# Patient Record
Sex: MALE | Race: WHITE | NOT HISPANIC OR LATINO | URBAN - NONMETROPOLITAN AREA
[De-identification: names, ages, dates, MRNs, and addresses within clinical notes are randomized per-mention and may not be internally consistent; named-entity substitution may affect disease eponyms.]

---

## 2023-02-10 PROBLEM — E11.9 DIABETES MELLITUS, TYPE 2 (MULTI): Status: ACTIVE | Noted: 2023-02-10

## 2023-02-10 PROBLEM — M25.561 ARTHRALGIA OF BOTH KNEES: Status: ACTIVE | Noted: 2023-02-10

## 2023-02-10 PROBLEM — R93.1 AGATSTON CAC SCORE 200-399: Status: ACTIVE | Noted: 2023-02-10

## 2023-02-10 PROBLEM — M25.562 ARTHRALGIA OF BOTH KNEES: Status: ACTIVE | Noted: 2023-02-10

## 2023-02-10 PROBLEM — R73.03 PREDIABETES: Status: ACTIVE | Noted: 2023-02-10

## 2023-02-10 PROBLEM — R79.89 ELEVATED SERUM CREATININE: Status: ACTIVE | Noted: 2023-02-10

## 2023-02-10 PROBLEM — I10 HTN (HYPERTENSION): Status: ACTIVE | Noted: 2023-02-10

## 2023-02-10 PROBLEM — R94.31 ABNORMAL EKG: Status: ACTIVE | Noted: 2023-02-10

## 2023-02-10 PROBLEM — R53.82 CHRONIC FATIGUE: Status: ACTIVE | Noted: 2023-02-10

## 2023-02-10 PROBLEM — M25.542 ARTHRALGIA OF BOTH HANDS: Status: ACTIVE | Noted: 2023-02-10

## 2023-02-10 PROBLEM — M25.541 ARTHRALGIA OF BOTH HANDS: Status: ACTIVE | Noted: 2023-02-10

## 2023-02-10 PROBLEM — E78.00 ELEVATED LDL CHOLESTEROL LEVEL: Status: ACTIVE | Noted: 2023-02-10

## 2023-02-10 PROBLEM — M25.50 JOINT PAIN: Status: ACTIVE | Noted: 2023-02-10

## 2023-02-10 RX ORDER — MULTIVIT WITH CALCIUM,IRON,MIN 18MG-0.4MG
1 TABLET ORAL DAILY
COMMUNITY

## 2023-02-10 RX ORDER — MAG HYDROX/ALUMINUM HYD/SIMETH 400-400-40
1 SUSPENSION, ORAL (FINAL DOSE FORM) ORAL DAILY
COMMUNITY

## 2023-02-10 RX ORDER — LOSARTAN POTASSIUM 100 MG/1
100 TABLET ORAL DAILY
COMMUNITY
Start: 2020-10-27 | End: 2023-10-16 | Stop reason: SDUPTHER

## 2023-02-10 RX ORDER — BACITRACIN 500 UNIT/G
1 OINTMENT (GRAM) TOPICAL
COMMUNITY

## 2023-02-10 RX ORDER — PRASTERONE (DHEA) 50 MG
1 CAPSULE ORAL DAILY
COMMUNITY

## 2023-02-10 RX ORDER — VANADIUM 100 %
1 GRANULES (GRAM) MISCELLANEOUS DAILY
COMMUNITY

## 2023-02-10 RX ORDER — CLONIDINE HYDROCHLORIDE 0.2 MG/1
0.2 TABLET ORAL 3 TIMES DAILY
COMMUNITY
Start: 2020-09-08 | End: 2023-10-16 | Stop reason: SDUPTHER

## 2023-02-10 RX ORDER — ASPIRIN 81 MG/1
81 TABLET ORAL DAILY
COMMUNITY
Start: 2020-09-18

## 2023-02-10 RX ORDER — CARVEDILOL 25 MG/1
25 TABLET ORAL 2 TIMES DAILY
COMMUNITY
Start: 2020-10-13 | End: 2023-10-16 | Stop reason: SDUPTHER

## 2023-04-14 ENCOUNTER — OFFICE VISIT (OUTPATIENT)
Dept: PRIMARY CARE | Facility: CLINIC | Age: 63
End: 2023-04-14
Payer: MEDICAID

## 2023-04-14 ENCOUNTER — LAB (OUTPATIENT)
Dept: LAB | Facility: LAB | Age: 63
End: 2023-04-14
Payer: MEDICAID

## 2023-04-14 VITALS
DIASTOLIC BLOOD PRESSURE: 72 MMHG | BODY MASS INDEX: 36.07 KG/M2 | WEIGHT: 266.3 LBS | HEART RATE: 67 BPM | HEIGHT: 72 IN | SYSTOLIC BLOOD PRESSURE: 130 MMHG | OXYGEN SATURATION: 98 %

## 2023-04-14 DIAGNOSIS — M25.561 ARTHRALGIA OF BOTH KNEES: ICD-10-CM

## 2023-04-14 DIAGNOSIS — R79.89 ELEVATED SERUM CREATININE: ICD-10-CM

## 2023-04-14 DIAGNOSIS — I10 PRIMARY HYPERTENSION: ICD-10-CM

## 2023-04-14 DIAGNOSIS — E78.00 ELEVATED LDL CHOLESTEROL LEVEL: ICD-10-CM

## 2023-04-14 DIAGNOSIS — Z12.5 PROSTATE CANCER SCREENING: ICD-10-CM

## 2023-04-14 DIAGNOSIS — S99.921A INJURY OF RIGHT FOOT, INITIAL ENCOUNTER: ICD-10-CM

## 2023-04-14 DIAGNOSIS — M25.562 ARTHRALGIA OF BOTH KNEES: ICD-10-CM

## 2023-04-14 DIAGNOSIS — E11.69 TYPE 2 DIABETES MELLITUS WITH OTHER SPECIFIED COMPLICATION, WITHOUT LONG-TERM CURRENT USE OF INSULIN (MULTI): Primary | ICD-10-CM

## 2023-04-14 DIAGNOSIS — E11.69 TYPE 2 DIABETES MELLITUS WITH OTHER SPECIFIED COMPLICATION, WITHOUT LONG-TERM CURRENT USE OF INSULIN (MULTI): ICD-10-CM

## 2023-04-14 LAB
ESTIMATED AVERAGE GLUCOSE FOR HBA1C: 160 MG/DL
HEMOGLOBIN A1C/HEMOGLOBIN TOTAL IN BLOOD: 7.2 %

## 2023-04-14 PROCEDURE — 1036F TOBACCO NON-USER: CPT | Performed by: STUDENT IN AN ORGANIZED HEALTH CARE EDUCATION/TRAINING PROGRAM

## 2023-04-14 PROCEDURE — 4010F ACE/ARB THERAPY RXD/TAKEN: CPT | Performed by: STUDENT IN AN ORGANIZED HEALTH CARE EDUCATION/TRAINING PROGRAM

## 2023-04-14 PROCEDURE — 3075F SYST BP GE 130 - 139MM HG: CPT | Performed by: STUDENT IN AN ORGANIZED HEALTH CARE EDUCATION/TRAINING PROGRAM

## 2023-04-14 PROCEDURE — 83036 HEMOGLOBIN GLYCOSYLATED A1C: CPT

## 2023-04-14 PROCEDURE — 3051F HG A1C>EQUAL 7.0%<8.0%: CPT | Performed by: STUDENT IN AN ORGANIZED HEALTH CARE EDUCATION/TRAINING PROGRAM

## 2023-04-14 PROCEDURE — 3078F DIAST BP <80 MM HG: CPT | Performed by: STUDENT IN AN ORGANIZED HEALTH CARE EDUCATION/TRAINING PROGRAM

## 2023-04-14 PROCEDURE — 36415 COLL VENOUS BLD VENIPUNCTURE: CPT

## 2023-04-14 PROCEDURE — 99214 OFFICE O/P EST MOD 30 MIN: CPT | Performed by: STUDENT IN AN ORGANIZED HEALTH CARE EDUCATION/TRAINING PROGRAM

## 2023-04-14 ASSESSMENT — PATIENT HEALTH QUESTIONNAIRE - PHQ9
SUM OF ALL RESPONSES TO PHQ9 QUESTIONS 1 AND 2: 0
1. LITTLE INTEREST OR PLEASURE IN DOING THINGS: NOT AT ALL
2. FEELING DOWN, DEPRESSED OR HOPELESS: NOT AT ALL

## 2023-04-14 NOTE — PROGRESS NOTES
Subjective   Patient ID: Jacobo Dixon is a 62 y.o. male who presents for 6 month (Pt. 6 month mdck.).    HPI    HTN   Was on dyazide and developed ckd from dehydration   Cozaar 50 mg 2 daily  Did not tolerate amlodipine  Tolerating current regimen at this time.   In the past, clonidine was reduced eventually, however, BP increased, due to which the clonidine was put back to his normal dose. currently taking 0.2mg. denies lightheadedness. Blood pressure is normal as well.   Plan: We will continue to current dosage for now as BP is within normal limits.   constipation and dry mouth from clonidine. which is manageable for now. taking fiber and increase water content.    Denies chest pain, SOB, headaches or blurry vision.     DM2: A1C is 7.1 most recent . Need A1C now.  recommended dietary modifications.     Right foot injury few days ago. Heavy object - trailer fell on the foot. Pain+. Is able to ambulate without support.   Plan: recommended to use hard sole shoe. Try to avoid pressure on the area. Xray ordered.       Review of Systems  ROS negative except discussed above in HPI.    Vitals:    04/14/23 0805   BP: 130/72   Pulse: 67   SpO2: 98%     Objective   Physical Exam  Constitutional:       Appearance: Normal appearance.   Cardiovascular:      Rate and Rhythm: Normal rate and regular rhythm.   Pulmonary:      Effort: Pulmonary effort is normal.      Breath sounds: Normal breath sounds.   Musculoskeletal:      Cervical back: Normal range of motion and neck supple.      Comments: Right foot with erythema and tenderness on palpation. Bruising noticed ini the right 2-4 toes.    Lymphadenopathy:      Cervical: No cervical adenopathy.   Neurological:      Mental Status: He is alert.           Assessment/Plan   Jacobo was seen today for 6 month.  Diagnoses and all orders for this visit:  Type 2 diabetes mellitus with other specified complication, without long-term current use of insulin (CMS/[facility]) (Primary)  -      Hemoglobin A1C; Standing  -     CBC and Auto Differential; Future  Primary hypertension  -     CBC and Auto Differential; Future  Elevated serum creatinine  -     Comprehensive Metabolic Panel; Future  Elevated LDL cholesterol level  -     Lipid Panel; Future  Prostate cancer screening  -     Prostate Specific Antigen, Screen; Future  -     CBC and Auto Differential; Future  Arthralgia of both knees  -     CBC and Auto Differential; Future      Follow up in 6 months.      Addendum with results: referral to podiatry is provided given the displaced first metatarsal fracture.     Kalpesh Carmona MD MPH

## 2023-04-17 DIAGNOSIS — S92.311A DISPLACED FRACTURE OF FIRST METATARSAL BONE, RIGHT FOOT, INITIAL ENCOUNTER FOR CLOSED FRACTURE: Primary | ICD-10-CM

## 2023-04-27 ENCOUNTER — HOSPITAL ENCOUNTER (OUTPATIENT)
Dept: DATA CONVERSION | Facility: HOSPITAL | Age: 63
End: 2023-04-27
Attending: PODIATRIST | Admitting: PODIATRIST
Payer: MEDICAID

## 2023-04-27 DIAGNOSIS — G47.33 OBSTRUCTIVE SLEEP APNEA (ADULT) (PEDIATRIC): ICD-10-CM

## 2023-04-27 DIAGNOSIS — S93.311A SUBLUXATION OF TARSAL JOINT OF RIGHT FOOT, INITIAL ENCOUNTER: ICD-10-CM

## 2023-04-27 DIAGNOSIS — S92.311A DISPLACED FRACTURE OF FIRST METATARSAL BONE, RIGHT FOOT, INITIAL ENCOUNTER FOR CLOSED FRACTURE: ICD-10-CM

## 2023-04-27 DIAGNOSIS — I10 ESSENTIAL (PRIMARY) HYPERTENSION: ICD-10-CM

## 2023-04-27 DIAGNOSIS — E11.9 TYPE 2 DIABETES MELLITUS WITHOUT COMPLICATIONS (MULTI): ICD-10-CM

## 2023-04-27 DIAGNOSIS — Z90.89 ACQUIRED ABSENCE OF OTHER ORGANS: ICD-10-CM

## 2023-04-27 DIAGNOSIS — Z79.82 LONG TERM (CURRENT) USE OF ASPIRIN: ICD-10-CM

## 2023-04-27 DIAGNOSIS — E66.01 MORBID (SEVERE) OBESITY DUE TO EXCESS CALORIES (MULTI): ICD-10-CM

## 2023-06-05 LAB — CALCIDIOL (25 OH VITAMIN D3) (NG/ML) IN SER/PLAS: 49 NG/ML

## 2023-09-07 VITALS — BODY MASS INDEX: 42.66 KG/M2 | WEIGHT: 315 LBS | HEIGHT: 72 IN

## 2023-09-14 NOTE — H&P
History & Physical Reviewed:   I have reviewed the History and Physical dated:  14-Apr-2023   History and Physical reviewed and relevant findings noted. Patient examined to review pertinent physical  findings.: No significant changes   Home Medications Reviewed: no changes noted   Allergies Reviewed: no changes noted       ERAS (Enhanced Recovery After Surgery):  ·  ERAS Patient: no     Consent:   COVID-19 Consent:  ·  COVID-19 Risk Consent Surgeon has reviewed key risks related to the risk of maury COVID-19 and if they contract COVID-19 what the risks are.       Electronic Signatures:  Sarita Waters)  (Signed 27-Apr-2023 07:06)   Authored: History & Physical Reviewed, ERAS, Consent,  Note Completion      Last Updated: 27-Apr-2023 07:06 by Sarita Waters)

## 2023-10-02 NOTE — OP NOTE
PROCEDURE DETAILS    Preoperative Diagnosis:   First metatarsal fracture right foot (S92.31A)  Postoperative Diagnosis:   First metatarsal fracture right foot (S92.31A)  Surgeon: Sarita Waters  Resident/Fellow/Other Assistant: None of these were associated with this case    Procedure:  1. ORIF RIGHT 1ST METATARSAL     Anesthesia: Bob Nichols  Estimated Blood Loss: 75 mL  Findings: fracture reduction maintained with solid fixation, hemostasis controlled, see detailed operation report  Specimens(s) Collected: no,     Complications: none  Drains and/or Catheters: none  Implants: cristy t plate, 3 x 2.7 locking screws, 3 x 2.7 cortical screws, 2-0 vicryl, 2-0 and 4-0 prolene/ nylon  Tourniquet Times: 350 mmHg thigh tourniquet, 6 min  Patient Returned To/Condition: stable        Operative Report:     Indications:    This pleasant 62-year-old male sustained a right foot injury about 2 weeks ago where a trailor piece crushed his right foot. He denies loss of consciousness or other injuries. This weighed over 1000 pounds. He does have a significant past medical history  of hypertension, and diabetes (A1c 7.1%).  He denies routine claudication or history of lower extremity wounds or delayed healing.  He also denies history of other fractures.  He presented initially to his PCP office (Dr. Matthews) and xray demonstrated  distal right first metatarsal fracture fracture in short oblique orientation. This is displaced and considered unstable. No other fractures were noted. His neurovascular status is intact clinically. He does not demonstrate pain or injury to lisfranc or  other midfoot structures.     The preoperative indications, benefits, risks, complications, and anticipated healing time and management were reviewed in detail with the patient.  Preoperative optimization, clearance, and all diagnostic data were reviewed in detail.  The patient elects  to proceed forward at this time.  The patient  understands risks and complications include but are not limited to the following: failed hardware, infection, delayed or nonhealing, need for further surgery, blood clot, allergic reaction, chronic pain syndrome,  use of long-term assisted device, swelling, scar, over or under correction, nerve injury with resultant pain or numbness, recurrence, postoperative arthritis, need for long term bracing, loss of function, loss of limb, or loss of life.  The surgical consent  and limb were signed.  I answered all the patient's questions to their satisfaction.    Procedure in detail:    The patient was transported to the operating room via cart and placed on the operating room table in the supine position in a well padded manner.  Preoperative IV ancef was administered.   Final verification of the patient, surgery, and limb designation was performed via the timeout procedure.  The anesthesia team initiated planned anesthesia (General). I administered right foot first ray block of 1:1 mix of 1% lidocaine plain and 0.5% marcaine plain (16cc) .   A well-padded pneumatic thigh tourniquet was placed on the surgical limb as noted.  The surgical limb was prepped and draped in the usual aseptic manner.  Surgery began in the following manner:    Esmarch was used to exsanguinate the limb  and the tourniquet was inflated at this time.  Attention was first directed to the medial dorsal right foot in which an incision was made through the skin.  Electrocautorization used to maintain hemostasis.  Venous tourniquet was supsected and the tourniquet  was d/c at this time. Bleeding significantly reduced at this time. Blunt dissection was performed down to the fracture hematoma site with care being taken to avoid and protect and retract all neurovascular structures at this point and throughout the remainder  of surgery.  Capsular incision was made to expose this site and metatarsal periosteum was preserved. Hemostasis was controlled with  electrocauterization and pressure.   The fracture hematoma  was mobilized and partial callous formation was excised from  the fracture margin to allow reduction.   The fracture was reduced with a bone reducing forceps and through plate reduction. The distal end of the T-plate at the first metatarsal head was secured.  Distraction was used to further reduce the fracture followed  by proximal cortical screw placement.  Proper AO fixation technique was utilized.  Additional cortical and locking screws were applied proximal and distal.    Solid fixation was achieved.  Direct visualization and intra operative flouroscopy evaluation  confirmed deformity and fracture fragment reduction and placement of hardware is in desired trajectory and position.   No additional acute findings were noted with radiographic evaluation at this time.  At this time copious saline irrigation was performed.   Deep closure was initiated.  Brisk capillary refill time was noted to all digits of the surgical limb and no pulsatile bleeding was noted.  Additional skin closure was performed with nylon and prolene with no touch none tensile manner including simple  and horizontal mattress techniques. Dressing was applied with Betadine soaked Adaptic, 4 x 4 gauze, Kerlix, abdominal pad.  His right leg burn to the anterior mid leg looks stable without infection also.  An adaptic and dry gauze was applied next.  Next,  a well-padded posterior mold fiberglass splint was applied with the surgical limb in a rectus position.  This was secured with ace wrap.    After procedure:    The patient tolerated the procedure and anesthesia well.    The patient was transported to the recovery area with vital signs stable and vascular status intact to the surgical limb.    Electronic orders placed.    To ice and elevate for pain and inflammation management.  Pain medication plan in place. He was advised on safe and proper use.  Clarified weightbearing status; non  weightbear surgical limb with splint and assistive device.   To take time off work.   Post operative xrays were reviewed as noted.  The patient will be discharged home upon continued stability.  To follow up next week at [facility].    ALFONZO Rowell  [facility]                            Attestation:   Note Completion:  Attending Attestation I performed the procedure without a resident         Electronic Signatures:  Sarita Waters)  (Signed 27-Apr-2023 11:42)   Authored: Post-Operative Note, Chart Review, Note Completion      Last Updated: 27-Apr-2023 11:42 by Sarita Waters)

## 2023-10-11 ENCOUNTER — LAB (OUTPATIENT)
Dept: LAB | Facility: LAB | Age: 63
End: 2023-10-11
Payer: MEDICAID

## 2023-10-11 DIAGNOSIS — E11.69 TYPE 2 DIABETES MELLITUS WITH OTHER SPECIFIED COMPLICATION, WITHOUT LONG-TERM CURRENT USE OF INSULIN (MULTI): ICD-10-CM

## 2023-10-11 LAB
EST. AVERAGE GLUCOSE BLD GHB EST-MCNC: 174 MG/DL
HBA1C MFR BLD: 7.7 %

## 2023-10-11 PROCEDURE — 36415 COLL VENOUS BLD VENIPUNCTURE: CPT

## 2023-10-11 PROCEDURE — 83036 HEMOGLOBIN GLYCOSYLATED A1C: CPT

## 2023-10-16 ENCOUNTER — TELEPHONE (OUTPATIENT)
Dept: PRIMARY CARE | Facility: CLINIC | Age: 63
End: 2023-10-16

## 2023-10-16 ENCOUNTER — ANCILLARY PROCEDURE (OUTPATIENT)
Dept: RADIOLOGY | Facility: CLINIC | Age: 63
End: 2023-10-16
Payer: MEDICAID

## 2023-10-16 ENCOUNTER — OFFICE VISIT (OUTPATIENT)
Dept: PRIMARY CARE | Facility: CLINIC | Age: 63
End: 2023-10-16
Payer: MEDICAID

## 2023-10-16 VITALS
SYSTOLIC BLOOD PRESSURE: 160 MMHG | HEIGHT: 72 IN | BODY MASS INDEX: 37.03 KG/M2 | HEART RATE: 66 BPM | OXYGEN SATURATION: 98 % | DIASTOLIC BLOOD PRESSURE: 90 MMHG | WEIGHT: 273.4 LBS

## 2023-10-16 DIAGNOSIS — S92.311A DISPLACED FRACTURE OF FIRST METATARSAL BONE, RIGHT FOOT, INITIAL ENCOUNTER FOR CLOSED FRACTURE: ICD-10-CM

## 2023-10-16 DIAGNOSIS — Z12.5 PROSTATE CANCER SCREENING: ICD-10-CM

## 2023-10-16 DIAGNOSIS — E78.00 ELEVATED LDL CHOLESTEROL LEVEL: Primary | ICD-10-CM

## 2023-10-16 DIAGNOSIS — I10 HYPERTENSION, UNSPECIFIED TYPE: ICD-10-CM

## 2023-10-16 DIAGNOSIS — E11.69 TYPE 2 DIABETES MELLITUS WITH OTHER SPECIFIED COMPLICATION, WITHOUT LONG-TERM CURRENT USE OF INSULIN (MULTI): ICD-10-CM

## 2023-10-16 PROCEDURE — 3008F BODY MASS INDEX DOCD: CPT | Performed by: STUDENT IN AN ORGANIZED HEALTH CARE EDUCATION/TRAINING PROGRAM

## 2023-10-16 PROCEDURE — 73630 X-RAY EXAM OF FOOT: CPT | Mod: RIGHT SIDE | Performed by: RADIOLOGY

## 2023-10-16 PROCEDURE — 3049F LDL-C 100-129 MG/DL: CPT | Performed by: STUDENT IN AN ORGANIZED HEALTH CARE EDUCATION/TRAINING PROGRAM

## 2023-10-16 PROCEDURE — 4010F ACE/ARB THERAPY RXD/TAKEN: CPT | Performed by: STUDENT IN AN ORGANIZED HEALTH CARE EDUCATION/TRAINING PROGRAM

## 2023-10-16 PROCEDURE — 73630 X-RAY EXAM OF FOOT: CPT | Mod: RT,FY

## 2023-10-16 PROCEDURE — 3080F DIAST BP >= 90 MM HG: CPT | Performed by: STUDENT IN AN ORGANIZED HEALTH CARE EDUCATION/TRAINING PROGRAM

## 2023-10-16 PROCEDURE — 3051F HG A1C>EQUAL 7.0%<8.0%: CPT | Performed by: STUDENT IN AN ORGANIZED HEALTH CARE EDUCATION/TRAINING PROGRAM

## 2023-10-16 PROCEDURE — 3077F SYST BP >= 140 MM HG: CPT | Performed by: STUDENT IN AN ORGANIZED HEALTH CARE EDUCATION/TRAINING PROGRAM

## 2023-10-16 PROCEDURE — 1036F TOBACCO NON-USER: CPT | Performed by: STUDENT IN AN ORGANIZED HEALTH CARE EDUCATION/TRAINING PROGRAM

## 2023-10-16 PROCEDURE — 99213 OFFICE O/P EST LOW 20 MIN: CPT | Performed by: STUDENT IN AN ORGANIZED HEALTH CARE EDUCATION/TRAINING PROGRAM

## 2023-10-16 RX ORDER — LOSARTAN POTASSIUM 100 MG/1
100 TABLET ORAL DAILY
Qty: 90 TABLET | Refills: 3 | Status: SHIPPED | OUTPATIENT
Start: 2023-10-16 | End: 2024-10-15

## 2023-10-16 RX ORDER — METFORMIN HYDROCHLORIDE 500 MG/1
500 TABLET ORAL
Qty: 60 TABLET | Refills: 11 | Status: SHIPPED | OUTPATIENT
Start: 2023-10-16 | End: 2024-10-15

## 2023-10-16 RX ORDER — CARVEDILOL 25 MG/1
25 TABLET ORAL 2 TIMES DAILY
Qty: 180 TABLET | Refills: 3 | Status: SHIPPED | OUTPATIENT
Start: 2023-10-16 | End: 2024-10-15

## 2023-10-16 RX ORDER — CLONIDINE HYDROCHLORIDE 0.3 MG/1
0.3 TABLET ORAL 3 TIMES DAILY
Qty: 270 TABLET | Refills: 3 | Status: SHIPPED | OUTPATIENT
Start: 2023-10-16 | End: 2024-10-15

## 2023-10-16 NOTE — PROGRESS NOTES
Subjective   Patient ID: aJcobo Dixon is a 63 y.o. male who presents for 6 month mdck (6 month mdck. Pt. Denies and concerns).    HPI    Patient ID: Jacobo Dixon is a 62 y.o. male who presents for 6 month (Pt. 6 month mdck.).     HPI     HTN   Was on dyazide and developed ckd from dehydration   Cozaar 50 mg 2 daily  Did not tolerate amlodipine  Tolerating current regimen at this time.   In the past, clonidine was reduced eventually, however, BP increased, due to which the clonidine was put back to his normal dose. currently taking 0.2mg. denies lightheadedness.   Plan: BP high again.   constipation and dry mouth from clonidine. which is manageable for now. taking fiber and increase water content.Increasing dose of Clonidine.      Denies chest pain, SOB, headaches or blurry vision.      DM2: A1C is 7.7 with most recent blood test. up from 7.1.  recommended dietary modifications. Starting metformin.        Review of Systems  ROS negative except discussed above in HPI.    Vitals:    10/16/23 0751   BP: 160/90   Pulse: 66   SpO2: 98%     Objective   Physical Exam  Constitutional:       Appearance: Normal appearance.   Cardiovascular:      Rate and Rhythm: Normal rate and regular rhythm.   Pulmonary:      Effort: Pulmonary effort is normal.      Breath sounds: Normal breath sounds.   Musculoskeletal:      Cervical back: Normal range of motion and neck supple.   Lymphadenopathy:      Cervical: No cervical adenopathy.   Neurological:      Mental Status: He is alert.           Assessment/Plan   Jacobo was seen today for 6 month mdck.  Diagnoses and all orders for this visit:  Elevated LDL cholesterol level (Primary)  -     Lipid Panel; Future  Hypertension, unspecified type  -     carvedilol (Coreg) 25 mg tablet; Take 1 tablet (25 mg) by mouth 2 times a day.  -     cloNIDine (Catapres) 0.3 mg tablet; Take 1 tablet (0.3 mg) by mouth 3 times a day.  -     losartan (Cozaar) 100 mg tablet; Take 1 tablet (100 mg) by mouth  once daily.  Type 2 diabetes mellitus with other specified complication, without long-term current use of insulin (CMS/HCC)  -     metFORMIN (Glucophage) 500 mg tablet; Take 1 tablet (500 mg) by mouth 2 times a day with meals.  -     Basic Metabolic Panel; Future  Prostate cancer screening  -     Prostate Specific Antigen, Screen; Future      Follow up in 3 months.         Kalpesh Carmona MD MPH

## 2023-10-16 NOTE — TELEPHONE ENCOUNTER
10/16/23  Jacobo is requesting a seat belt waiver.   The doctor that he used to get it from passed away.     [phone]  Magalis Dixon

## 2023-10-16 NOTE — ASSESSMENT & PLAN NOTE
His A1C got worse up to 7.7 from 7.1. His diet has been poor. Reports that he will try to eat better.   He was prescribed Metformin before. But has not been taking it.

## 2023-10-17 ENCOUNTER — LAB (OUTPATIENT)
Dept: LAB | Facility: LAB | Age: 63
End: 2023-10-17
Payer: MEDICAID

## 2023-10-17 DIAGNOSIS — E11.69 TYPE 2 DIABETES MELLITUS WITH OTHER SPECIFIED COMPLICATION, WITHOUT LONG-TERM CURRENT USE OF INSULIN (MULTI): ICD-10-CM

## 2023-10-17 DIAGNOSIS — E78.00 ELEVATED LDL CHOLESTEROL LEVEL: ICD-10-CM

## 2023-10-17 DIAGNOSIS — Z12.5 PROSTATE CANCER SCREENING: ICD-10-CM

## 2023-10-17 LAB
ANION GAP SERPL CALC-SCNC: 8 MMOL/L (ref 10–20)
BUN SERPL-MCNC: 24 MG/DL (ref 6–23)
CALCIUM SERPL-MCNC: 9.7 MG/DL (ref 8.6–10.3)
CHLORIDE SERPL-SCNC: 103 MMOL/L (ref 98–107)
CHOLEST SERPL-MCNC: 182 MG/DL (ref 0–199)
CHOLESTEROL/HDL RATIO: 4.8
CO2 SERPL-SCNC: 32 MMOL/L (ref 21–32)
CREAT SERPL-MCNC: 1.25 MG/DL (ref 0.5–1.3)
GFR SERPL CREATININE-BSD FRML MDRD: 65 ML/MIN/1.73M*2
GLUCOSE SERPL-MCNC: 134 MG/DL (ref 74–99)
HDLC SERPL-MCNC: 38 MG/DL
LDLC SERPL CALC-MCNC: 128 MG/DL
NON HDL CHOLESTEROL: 144 MG/DL (ref 0–149)
POTASSIUM SERPL-SCNC: 4.2 MMOL/L (ref 3.5–5.3)
PSA SERPL-MCNC: 1.16 NG/ML
SODIUM SERPL-SCNC: 139 MMOL/L (ref 136–145)
TRIGL SERPL-MCNC: 79 MG/DL (ref 0–149)
VLDL: 16 MG/DL (ref 0–40)

## 2023-10-17 PROCEDURE — 84153 ASSAY OF PSA TOTAL: CPT

## 2023-10-17 PROCEDURE — 80061 LIPID PANEL: CPT

## 2023-10-17 PROCEDURE — 36415 COLL VENOUS BLD VENIPUNCTURE: CPT

## 2023-10-17 PROCEDURE — 80048 BASIC METABOLIC PNL TOTAL CA: CPT

## 2023-11-13 ENCOUNTER — ANCILLARY PROCEDURE (OUTPATIENT)
Dept: RADIOLOGY | Facility: CLINIC | Age: 63
End: 2023-11-13
Payer: MEDICAID

## 2023-11-13 DIAGNOSIS — S92.311A DISPLACED FRACTURE OF FIRST METATARSAL BONE, RIGHT FOOT, INITIAL ENCOUNTER FOR CLOSED FRACTURE: ICD-10-CM

## 2023-11-13 PROCEDURE — 73630 X-RAY EXAM OF FOOT: CPT | Mod: RT,FY

## 2023-11-13 PROCEDURE — 73630 X-RAY EXAM OF FOOT: CPT | Mod: RIGHT SIDE | Performed by: RADIOLOGY

## 2023-12-11 ENCOUNTER — ANCILLARY PROCEDURE (OUTPATIENT)
Dept: RADIOLOGY | Facility: CLINIC | Age: 63
End: 2023-12-11
Payer: MEDICAID

## 2023-12-11 DIAGNOSIS — S92.311A DISPLACED FRACTURE OF FIRST METATARSAL BONE, RIGHT FOOT, INITIAL ENCOUNTER FOR CLOSED FRACTURE: ICD-10-CM

## 2023-12-11 PROCEDURE — 73630 X-RAY EXAM OF FOOT: CPT | Mod: RIGHT SIDE | Performed by: RADIOLOGY

## 2023-12-11 PROCEDURE — 73630 X-RAY EXAM OF FOOT: CPT | Mod: RT

## 2024-01-08 ENCOUNTER — TELEPHONE (OUTPATIENT)
Dept: PRIMARY CARE | Facility: CLINIC | Age: 64
End: 2024-01-08
Payer: MEDICAID

## 2024-01-10 ENCOUNTER — LAB (OUTPATIENT)
Dept: LAB | Facility: LAB | Age: 64
End: 2024-01-10
Payer: MEDICAID

## 2024-01-10 DIAGNOSIS — E11.69 TYPE 2 DIABETES MELLITUS WITH OTHER SPECIFIED COMPLICATION, WITHOUT LONG-TERM CURRENT USE OF INSULIN (MULTI): ICD-10-CM

## 2024-01-10 LAB
EST. AVERAGE GLUCOSE BLD GHB EST-MCNC: 157 MG/DL
HBA1C MFR BLD: 7.1 %

## 2024-01-10 PROCEDURE — 36415 COLL VENOUS BLD VENIPUNCTURE: CPT

## 2024-01-10 PROCEDURE — 83036 HEMOGLOBIN GLYCOSYLATED A1C: CPT

## 2024-01-15 ENCOUNTER — ANCILLARY PROCEDURE (OUTPATIENT)
Dept: RADIOLOGY | Facility: CLINIC | Age: 64
End: 2024-01-15
Payer: MEDICAID

## 2024-01-15 ENCOUNTER — OFFICE VISIT (OUTPATIENT)
Dept: PRIMARY CARE | Facility: CLINIC | Age: 64
End: 2024-01-15
Payer: MEDICAID

## 2024-01-15 VITALS
OXYGEN SATURATION: 95 % | SYSTOLIC BLOOD PRESSURE: 128 MMHG | WEIGHT: 271.8 LBS | BODY MASS INDEX: 36.89 KG/M2 | HEART RATE: 68 BPM | DIASTOLIC BLOOD PRESSURE: 88 MMHG

## 2024-01-15 DIAGNOSIS — M25.561 CHRONIC PAIN OF BOTH KNEES: Primary | ICD-10-CM

## 2024-01-15 DIAGNOSIS — M25.562 CHRONIC PAIN OF BOTH KNEES: ICD-10-CM

## 2024-01-15 DIAGNOSIS — G89.29 CHRONIC PAIN OF BOTH KNEES: ICD-10-CM

## 2024-01-15 DIAGNOSIS — E11.69 TYPE 2 DIABETES MELLITUS WITH OTHER SPECIFIED COMPLICATION, WITHOUT LONG-TERM CURRENT USE OF INSULIN (MULTI): ICD-10-CM

## 2024-01-15 DIAGNOSIS — M25.561 CHRONIC PAIN OF BOTH KNEES: ICD-10-CM

## 2024-01-15 DIAGNOSIS — G89.29 CHRONIC PAIN OF BOTH KNEES: Primary | ICD-10-CM

## 2024-01-15 DIAGNOSIS — I10 PRIMARY HYPERTENSION: ICD-10-CM

## 2024-01-15 DIAGNOSIS — M25.562 CHRONIC PAIN OF BOTH KNEES: Primary | ICD-10-CM

## 2024-01-15 PROCEDURE — 73564 X-RAY EXAM KNEE 4 OR MORE: CPT | Mod: 50

## 2024-01-15 PROCEDURE — 3074F SYST BP LT 130 MM HG: CPT | Performed by: STUDENT IN AN ORGANIZED HEALTH CARE EDUCATION/TRAINING PROGRAM

## 2024-01-15 PROCEDURE — 3051F HG A1C>EQUAL 7.0%<8.0%: CPT | Performed by: STUDENT IN AN ORGANIZED HEALTH CARE EDUCATION/TRAINING PROGRAM

## 2024-01-15 PROCEDURE — 3008F BODY MASS INDEX DOCD: CPT | Performed by: STUDENT IN AN ORGANIZED HEALTH CARE EDUCATION/TRAINING PROGRAM

## 2024-01-15 PROCEDURE — 73564 X-RAY EXAM KNEE 4 OR MORE: CPT | Mod: BILATERAL PROCEDURE | Performed by: STUDENT IN AN ORGANIZED HEALTH CARE EDUCATION/TRAINING PROGRAM

## 2024-01-15 PROCEDURE — 3079F DIAST BP 80-89 MM HG: CPT | Performed by: STUDENT IN AN ORGANIZED HEALTH CARE EDUCATION/TRAINING PROGRAM

## 2024-01-15 PROCEDURE — 4010F ACE/ARB THERAPY RXD/TAKEN: CPT | Performed by: STUDENT IN AN ORGANIZED HEALTH CARE EDUCATION/TRAINING PROGRAM

## 2024-01-15 PROCEDURE — 99214 OFFICE O/P EST MOD 30 MIN: CPT | Performed by: STUDENT IN AN ORGANIZED HEALTH CARE EDUCATION/TRAINING PROGRAM

## 2024-01-15 PROCEDURE — 1036F TOBACCO NON-USER: CPT | Performed by: STUDENT IN AN ORGANIZED HEALTH CARE EDUCATION/TRAINING PROGRAM

## 2024-01-15 RX ORDER — OMEGA-3S/DHA/EPA/FISH OIL 1000-1400
CAPSULE,DELAYED RELEASE (ENTERIC COATED) ORAL
COMMUNITY

## 2024-01-15 ASSESSMENT — PATIENT HEALTH QUESTIONNAIRE - PHQ9
2. FEELING DOWN, DEPRESSED OR HOPELESS: NOT AT ALL
SUM OF ALL RESPONSES TO PHQ9 QUESTIONS 1 AND 2: 0
1. LITTLE INTEREST OR PLEASURE IN DOING THINGS: NOT AT ALL

## 2024-01-15 NOTE — PROGRESS NOTES
Subjective   Patient ID: Jacobo Dixon is a 63 y.o. male who presents for Follow-up (PT is here today for 3 month FUV, reports both knees hurt, reports the right one hurts the worst. Reports he fell on ice a few years ago and when he over does it, they hurt. ).    HPI    HPI    Bilateral knee pain: 5-6 years ago injury - slipped on ice. Took glucosamine as well for other joint pains. Slightly improved.   Plan: xray ordered. PT recommended and offered Ortho referral. Will start PT. If no improvement then will consider Ortho.      HTN   Was on dyazide and developed ckd from dehydration   Cozaar 50 mg 2 daily  Did not tolerate amlodipine  Tolerating current regimen at this time.   In the past, clonidine was reduced eventually, however, BP increased, due to which the clonidine was put back to his normal dose. currently taking 0.2mg. denies lightheadedness.   Increased dose of Clonidine at previous visit.    Plan: BP much better now. Will continue current regimen.       Denies chest pain, SOB, headaches or blurry vision.      DM2: A1C is 7.1 with most recent blood test. down from 7.7.  Denies polyuria, polydipsia or blurry vision. recommended dietary modifications. Starting metformin.     Review of Systems  ROS negative except discussed above in HPI.    Vitals:    01/15/24 0749   BP: 128/88   Pulse: 68   SpO2: 95%     Objective   Physical Exam  Constitutional:       Appearance: Normal appearance.   Cardiovascular:      Rate and Rhythm: Normal rate and regular rhythm.   Pulmonary:      Effort: Pulmonary effort is normal.      Breath sounds: Normal breath sounds.   Musculoskeletal:      Comments: No significant joint line tenderness.mild effusion. Normal ROM. Tenderness in the lateral collateral ligament.    Neurological:      Mental Status: He is alert.       Assessment/Plan   Jacobo was seen today for follow-up.  Diagnoses and all orders for this visit:  Chronic pain of both knees (Primary)  -     XR knee 4+ views  bilateral; Future  -     Referral to Physical Therapy; Future  Type 2 diabetes mellitus with other specified complication, without long-term current use of insulin (CMS/HCC)  Primary hypertension      Follow up in 3 months.          Kalpesh Carmona MD MPH

## 2024-01-25 ENCOUNTER — APPOINTMENT (OUTPATIENT)
Dept: PHYSICAL THERAPY | Facility: CLINIC | Age: 64
End: 2024-01-25
Payer: MEDICAID

## 2024-01-25 ENCOUNTER — DOCUMENTATION (OUTPATIENT)
Dept: PHYSICAL THERAPY | Facility: CLINIC | Age: 64
End: 2024-01-25
Payer: MEDICAID

## 2024-01-25 NOTE — PROGRESS NOTES
Physical Therapy                 Therapy Communication Note    Patient Name: Jacobo Dixon  MRN: [id number]  Today's Date: 1/25/2024     Discipline: Physical Therapy    Missed Visit Reason:      Missed Time: Cancel    Comment:

## 2024-02-02 ENCOUNTER — EVALUATION (OUTPATIENT)
Dept: PHYSICAL THERAPY | Facility: CLINIC | Age: 64
End: 2024-02-02
Payer: MEDICAID

## 2024-02-02 DIAGNOSIS — M25.561 CHRONIC PAIN OF BOTH KNEES: ICD-10-CM

## 2024-02-02 DIAGNOSIS — G89.29 CHRONIC PAIN OF BOTH KNEES: ICD-10-CM

## 2024-02-02 DIAGNOSIS — M25.562 CHRONIC PAIN OF BOTH KNEES: ICD-10-CM

## 2024-02-02 PROCEDURE — 97161 PT EVAL LOW COMPLEX 20 MIN: CPT | Mod: GP

## 2024-02-02 ASSESSMENT — PAIN SCALES - GENERAL: PAINLEVEL_OUTOF10: 6

## 2024-02-02 ASSESSMENT — ENCOUNTER SYMPTOMS
LOSS OF SENSATION IN FEET: 1
DEPRESSION: 0
OCCASIONAL FEELINGS OF UNSTEADINESS: 0

## 2024-02-02 ASSESSMENT — PAIN - FUNCTIONAL ASSESSMENT: PAIN_FUNCTIONAL_ASSESSMENT: 0-10

## 2024-02-02 ASSESSMENT — ACTIVITIES OF DAILY LIVING (ADL): EFFECT OF PAIN ON DAILY ACTIVITIES: SEE GOALS

## 2024-02-02 NOTE — PROGRESS NOTES
Patient Name: Jacobo Dixon  MRN: [id number]  Today's Date: 2/2/2024  Time Calculation  Start Time: 0714      Assessment:  Rehab Prognosis: Fair (chronic sx HX)  Barriers to Participation:  (none)  C/C sx: see pain section  EDIE:    see pain section  ADL makes worse?: walking in the mud  ADL makes better?:------  Testing:films show-  Narrative & Impression    MPRESSION:  1. Bilateral mild knee osteoarthritic changes. Mild bilateral knee  joint effusion.  2. Right knee chondrocalcinosis. Differential diagnosis is broad and  include (although not limited to) osteoarthritis or gout. Advise  clinical correlation  3. Bony demineralization    ORIF HX RLE in 1987-metal removed but recent plate placement in R ankle     Physical Findings: Limited-  strength;                                                  POC:  Ongoing clinic PT to include: B hip/knee PRE; gait work PRN.  Already educated the patient to minimize deep squatting or kneeling.  There also maybe a leg length difference?.    Other:  (fall risk?-  min )   Plan:  Treatment/Interventions: Aquatic therapy, Cryotherapy, Education/ Instruction, Electrical stimulation, Gait training, Hot pack, Manual therapy, Self care/ home management, Therapeutic exercises (IASTM/cupping)  PT Plan: Skilled PT  PT Frequency: 2 times per week  Duration: 4wks  Onset Date: 02/02/18  Certification Period Start Date: 02/02/24  Certification Period End Date: 05/02/24  Rehab Potential: Fair (chronic syndrome)  Plan of Care Agreement: Patient    Current Problem:   1. Chronic pain of both knees  Referral to Physical Therapy          Subjective    General:  General  Reason for Referral: B knee pain  Referred By: Mal  General Comment: 1/9  Precautions:  Precautions  WALLACEADI Fall Risk Score (The score of 4 or more indicates an increased risk of falling): 4  Precautions Comment: arthralgia multiple jts    Pain:  Pain Assessment  Pain Assessment: 0-10  Pain Score: 6  Pain Location: Knee  Pain  "Orientation: Right, Left (R>L pain)  Effect of Pain on Daily Activities: see goals  Pain Interventions:  (films taken)    Prior Level of Function:  Prior Function Per Pt/Caregiver Report  Vocational: Self employed, Part time employment    Objective     Outcome Measures:  Other Measures  Other Outcome Measures: 52/80 LEFS     Treatments:    OP EDUCATION:  Outpatient Education  Individual(s) Educated: Patient  Patient/Caregiver Demonstrated Understanding: yes  Plan of Care Discussed and Agreed Upon: yes    Goals:  Active       PT Problem       PT Goal 1       Start:  02/02/24    Expected End:  05/02/24       1. Independent HEP to allow for 50% reduction in max ADL C/C sx ( 6/10) 2-3wks  2. 0/10 night time sx to allow for uninterrupted sleep x 1wk ( 1/7) 2-3wks  3. Survey score improvement from 52/80 to 60/80 (LEFS) 3-4wks  4. Strength increase to allow for improved ADL  stairs, STS (from 4/5 B hip abd to 5/5; 4+/5 B knee ext to 5/5) 3-4wks         PT Goal 2       Start:  02/02/24    Expected End:  05/02/24       1.\"I want my knees to feel better\".             KNEE        Knee Observation  Observation Comment: B ipsi trunk lean with sance during gait    Negative pat-fem crepitus with open chain extension B     Knee AROM WFL unless documented below  Knee AROM WFL: yes  Lower Extremity Strength:WFL unless documented  MMT 5/5 max  RIGHT LEFT   Hip Flexion 5/5 5/5   Hip Extension 5/5 5/5   Hip Abduction 4/5 4/5        Knee Extension 4+/5 4+/5   Knee Flexion 5/5 5/5                                     "

## 2024-02-06 ENCOUNTER — TREATMENT (OUTPATIENT)
Dept: PHYSICAL THERAPY | Facility: CLINIC | Age: 64
End: 2024-02-06
Payer: MEDICAID

## 2024-02-06 DIAGNOSIS — G89.29 CHRONIC PAIN OF BOTH KNEES: ICD-10-CM

## 2024-02-06 DIAGNOSIS — M25.561 CHRONIC PAIN OF BOTH KNEES: ICD-10-CM

## 2024-02-06 DIAGNOSIS — M25.562 CHRONIC PAIN OF BOTH KNEES: ICD-10-CM

## 2024-02-06 PROCEDURE — 97110 THERAPEUTIC EXERCISES: CPT | Mod: GP,CQ

## 2024-02-06 ASSESSMENT — PAIN - FUNCTIONAL ASSESSMENT: PAIN_FUNCTIONAL_ASSESSMENT: 0-10

## 2024-02-06 ASSESSMENT — PAIN SCALES - GENERAL: PAINLEVEL_OUTOF10: 3

## 2024-02-06 NOTE — PROGRESS NOTES
"Physical Therapy Treatment    Patient Name: Jacobo Dixon  MRN: [id number]  Today's Date: 2024  Time Calculation  Start Time: 702  Stop Time: 744  Time Calculation (min): 42 min      Assessment:   Patient identified with name and .   Introduced therex for BLE strength and ROM - good response and tolerance. Patient does need frequent cuing/instruction in correct form and to reduce speed during the ex's.    Plan:  Will continue therex to increase functional strength in knees to reduce difficulty with daily chores.   OP PT Plan  Treatment/Interventions: Aquatic therapy, Cryotherapy, Education/ Instruction, Electrical stimulation, Gait training, Hot pack, Manual therapy, Self care/ home management, Therapeutic exercises (IASTM/cupping)  PT Plan: Skilled PT  PT Frequency: 2 times per week  Duration: 4wks  Onset Date: 18  Certification Period Start Date: 24  Certification Period End Date: 24  Rehab Potential: Fair (chronic syndrome)  Plan of Care Agreement: Patient    Current Problem  Problem List Items Addressed This Visit             ICD-10-CM    Chronic pain of both knees M25.561, M25.562, G89.29       Subjective   Not much change this morning but my knees aren't hurting too bad. A little tired after the ex's but no increase in pain.    Precautions  Precautions  Precautions Comment: arthralgia multiple jts; ORIF RLE in -metal removed but recent plate placement in R ankle    Pain  Pain Assessment: 0-10  Pain Score: 3  Pain Location: Knee  Pain Orientation: Right, Left    Treatments:  Therapeutic Exercise  Therapeutic Exercise Performed: Yes  Therapeutic Exercise: 40 minutes, 3 units   Seated stepper, L1, x6'  Stretch on slant board: x2'   Standing hip ext and abd: 2x10 ea, B  Fwd step-ups: 2x10 B  Standing heel raises: x25 B  SLS: 3x20\" ea, 1 UE support  LAQ: 4#, 2x10 B        OP EDUCATION:   Reviewed symptom management.     Goals:  Active       PT Problem       PT Goal 1       Start:  " "02/02/24    Expected End:  05/02/24       1. Independent HEP to allow for 50% reduction in max ADL C/C sx ( 6/10) 2-3wks  2. 0/10 night time sx to allow for uninterrupted sleep x 1wk ( 1/7) 2-3wks  3. Survey score improvement from 52/80 to 60/80 (LEFS) 3-4wks  4. Strength increase to allow for improved ADL  stairs, STS (from 4/5 B hip abd to 5/5; 4+/5 B knee ext to 5/5) 3-4wks         PT Goal 2       Start:  02/02/24    Expected End:  05/02/24       1.\"I want my knees to feel better\".            "

## 2024-02-08 ENCOUNTER — TREATMENT (OUTPATIENT)
Dept: PHYSICAL THERAPY | Facility: CLINIC | Age: 64
End: 2024-02-08
Payer: MEDICAID

## 2024-02-08 DIAGNOSIS — M25.562 CHRONIC PAIN OF BOTH KNEES: ICD-10-CM

## 2024-02-08 DIAGNOSIS — G89.29 CHRONIC PAIN OF BOTH KNEES: ICD-10-CM

## 2024-02-08 DIAGNOSIS — M25.561 CHRONIC PAIN OF BOTH KNEES: ICD-10-CM

## 2024-02-08 PROCEDURE — 97110 THERAPEUTIC EXERCISES: CPT | Mod: GP,CQ

## 2024-02-08 ASSESSMENT — PAIN SCALES - GENERAL: PAINLEVEL_OUTOF10: 3

## 2024-02-08 ASSESSMENT — PAIN - FUNCTIONAL ASSESSMENT: PAIN_FUNCTIONAL_ASSESSMENT: 0-10

## 2024-02-08 NOTE — PROGRESS NOTES
"Physical Therapy Treatment    Patient Name: Jacobo Dixon  MRN: [id number]  Today's Date: 2024  Time Calculation  Start Time: 747  Stop Time: 828  Time Calculation (min): 41 min      Assessment:   Patient identified with name and .   Patient's wife indicated his BP has been low and he's been dizzy. BP taken today pre-treatment: 125/66; HR 65 bpm; SpO2 95%. BP post-treatment 125/75, HR 68 bpm, SpO2 96%. No c/o dizziness during treatment, only mild fatigue after session. Urged patient to contact his Dr or go to the ER if dizziness returns.       Plan:  Will continue therex as per patient tolerance to increase functional strength in knees to reduce difficulty with daily chores.    OP PT Plan  Treatment/Interventions: Aquatic therapy, Cryotherapy, Education/ Instruction, Electrical stimulation, Gait training, Hot pack, Manual therapy, Self care/ home management, Therapeutic exercises (IASTM/cupping)  PT Plan: Skilled PT  PT Frequency: 2 times per week  Duration: 4wks  Onset Date: 18  Certification Period Start Date: 24  Certification Period End Date: 24  Rehab Potential: Fair (chronic syndrome)  Plan of Care Agreement: Patient    Current Problem  Problem List Items Addressed This Visit             ICD-10-CM    Chronic pain of both knees M25.561, M25.562, G89.29       Subjective   Felt a little dizzy this morning but not dizzy now.     Precautions  Precautions  Precautions Comment: arthralgia multiple jts; ORIF RLE in -metal removed but recent plate placement in R ankle    Pain  Pain Assessment: 0-10  Pain Score: 3  Pain Location: Knee  Pain Orientation: Right, Left    Treatments:  Therapeutic Exercise  Therapeutic Exercise Performed: Yes  Therapeutic Exercise: 38 minutes, 3 units   Seated stepper, L1, x6'  [X]  Stretch on slant board: x2'   Standing hip ext and abd: 2x10 ea, B  Fwd step-ups: 2x10 B  Standing heel raises: x25 B  SLS: 3x20\" ea, 1 UE support  [X]  LAQ: 4#, 2x10 B  Seated HS " "curls: green band, 2x10 ea      OP EDUCATION:   Instructed in correct ex technique and importance of moving slower during the ex's.     Goals:  Active       PT Problem       PT Goal 1       Start:  02/02/24    Expected End:  05/02/24       1. Independent HEP to allow for 50% reduction in max ADL C/C sx ( 6/10) 2-3wks  2. 0/10 night time sx to allow for uninterrupted sleep x 1wk ( 1/7) 2-3wks  3. Survey score improvement from 52/80 to 60/80 (LEFS) 3-4wks  4. Strength increase to allow for improved ADL  stairs, STS (from 4/5 B hip abd to 5/5; 4+/5 B knee ext to 5/5) 3-4wks         PT Goal 2       Start:  02/02/24    Expected End:  05/02/24       1.\"I want my knees to feel better\".            "

## 2024-02-12 ENCOUNTER — TREATMENT (OUTPATIENT)
Dept: PHYSICAL THERAPY | Facility: CLINIC | Age: 64
End: 2024-02-12
Payer: MEDICAID

## 2024-02-12 DIAGNOSIS — M25.562 CHRONIC PAIN OF BOTH KNEES: ICD-10-CM

## 2024-02-12 DIAGNOSIS — M25.561 CHRONIC PAIN OF BOTH KNEES: ICD-10-CM

## 2024-02-12 DIAGNOSIS — G89.29 CHRONIC PAIN OF BOTH KNEES: ICD-10-CM

## 2024-02-12 PROCEDURE — 97110 THERAPEUTIC EXERCISES: CPT | Mod: GP,CQ

## 2024-02-12 ASSESSMENT — PAIN - FUNCTIONAL ASSESSMENT: PAIN_FUNCTIONAL_ASSESSMENT: 0-10

## 2024-02-12 NOTE — PROGRESS NOTES
"Physical Therapy Treatment    Patient Name: Jacobo Dixon  MRN: [id number]  Today's Date: 2024  Time Calculation  Start Time: 832  Stop Time: 915  Time Calculation (min): 43 min      Assessment:   Patient identified with name and .   No c/o dizziness throughout session. Better tolerance to therex today; mild fatigue post-treatment. Verbal instructions needed throughout session to reduce speed of ex's and to use correct form.    Plan:  Will continue therex as per patient tolerance to increase functional strength in knees to reduce difficulty with daily chores.       OP PT Plan  Treatment/Interventions: Aquatic therapy, Cryotherapy, Education/ Instruction, Electrical stimulation, Gait training, Hot pack, Manual therapy, Self care/ home management, Therapeutic exercises (IASTM/cupping)  PT Plan: Skilled PT  PT Frequency: 2 times per week  Duration: 4wks  Onset Date: 18  Certification Period Start Date: 24  Certification Period End Date: 24  Rehab Potential: Fair (chronic syndrome)  Plan of Care Agreement: Patient    Current Problem  Problem List Items Addressed This Visit             ICD-10-CM    Chronic pain of both knees M25.561, M25.562, G89.29       Subjective   Patient stated BP and HR and sugar levels have been normal and he hasn't been as dizzy.    Precautions  Precautions  Precautions Comment: arthralgia multiple jts; ORIF RLE in -metal removed but recent plate placement in R ankle    Pain  Pain Assessment: 0-10  Pain Score:  (1, left knee; 2, right knee)  Pain Location: Knee  Pain Orientation: Right, Left    Treatments:  Therapeutic Exercise  Therapeutic Exercise Performed: Yes  Therapeutic Exercise: 41 minutes, 3 units   Seated stepper, L1, x6.5'    Stretch on slant board: x3' total  Standing hip ext and abd: 2x10 ea, B  Fwd step-ups: 2x10 B  Standing heel raises: x25 B  Partial squats: 2x10  SLS: 3x20\" ea, 1 UE support   LAQ: 5#, 2x10 B  [P]  Seated HS curls: blue band, 2x10 ea " " [P]    OP EDUCATION:    Instructed in correct ex technique and importance of moving slower during the ex's.      Goals:  Active       PT Problem       PT Goal 1       Start:  02/02/24    Expected End:  05/02/24       1. Independent HEP to allow for 50% reduction in max ADL C/C sx ( 6/10) 2-3wks  2. 0/10 night time sx to allow for uninterrupted sleep x 1wk ( 1/7) 2-3wks  3. Survey score improvement from 52/80 to 60/80 (LEFS) 3-4wks  4. Strength increase to allow for improved ADL  stairs, STS (from 4/5 B hip abd to 5/5; 4+/5 B knee ext to 5/5) 3-4wks         PT Goal 2       Start:  02/02/24    Expected End:  05/02/24       1.\"I want my knees to feel better\".            "

## 2024-02-16 ENCOUNTER — TREATMENT (OUTPATIENT)
Dept: PHYSICAL THERAPY | Facility: CLINIC | Age: 64
End: 2024-02-16
Payer: MEDICAID

## 2024-02-16 DIAGNOSIS — G89.29 CHRONIC PAIN OF BOTH KNEES: ICD-10-CM

## 2024-02-16 DIAGNOSIS — M25.561 CHRONIC PAIN OF BOTH KNEES: ICD-10-CM

## 2024-02-16 DIAGNOSIS — M25.562 CHRONIC PAIN OF BOTH KNEES: ICD-10-CM

## 2024-02-16 PROCEDURE — 97110 THERAPEUTIC EXERCISES: CPT | Mod: GP,CQ

## 2024-02-16 ASSESSMENT — PAIN - FUNCTIONAL ASSESSMENT: PAIN_FUNCTIONAL_ASSESSMENT: 0-10

## 2024-02-16 ASSESSMENT — PAIN SCALES - GENERAL: PAINLEVEL_OUTOF10: 1

## 2024-02-16 NOTE — PROGRESS NOTES
Physical Therapy Treatment    Patient Name: Jacobo Dixon  MRN: [id number]  Today's Date: 2024  Time Calculation  Start Time: 744  Stop Time: 829  Time Calculation (min): 45 min      Assessment:   Patient identified with name and . Used roller bar this treatment due to tight LE musculature. Also administered HEP and  sheets /blue band given. Patient will need reinforcement with instruction.  Patient completed therapeutic exercises without complaints of increased pain.      Plan: Continue LE PRE's and ROM exercises to increase ADL endurance without complaints of increase pain. VIET Greer All treatment and clinical decision making directly supervised by Laura Bhardwaj PTA [id number].        OP PT Plan  Treatment/Interventions: Aquatic therapy, Cryotherapy, Education/ Instruction, Electrical stimulation, Gait training, Hot pack, Manual therapy, Self care/ home management, Therapeutic exercises (IASTM/cupping)  PT Plan: Skilled PT  PT Frequency: 2 times per week  Duration: 4wks  Onset Date: 18  Certification Period Start Date: 24  Certification Period End Date: 24  Rehab Potential: Fair (chronic syndrome)  Plan of Care Agreement: Patient    Current Problem  Problem List Items Addressed This Visit             ICD-10-CM       Musculoskeletal and Injuries    Chronic pain of both knees M25.561, M25.562, G89.29       Subjective  Patient states he is experiences the most pain when climbing in and out of machinery.     Precautions       Pain  Pain Assessment: 0-10  Pain Score: 1  Pain Location: Knee  Pain Orientation: Left, Right    Treatments:     Therapeutic Exercise:   Seated stepper, L1, x 6 mins   Roller Bar massage to: quads, ITB, HS, Calf bilat   SLR with TrA 10x bilat (N)  Hook lying hip abd green band 10x (N)  Hook lying marches green band 10x (N)  Hip add iso with play ball (N)  LAQ with blue band 10 x bilat (N)   Seated HS curls: blue band, 2x10 ea (N)  Side steps yellow loop band 2 laps  "(N)  Standing hip ext, flex and abd: 2x10 ea, B/L  Standing heel/ toe raises: x25 B/L  Stretch on slant board: x3' total   Fwd step-ups: 2x10 B   Partial squats: 2x10   SLS: 3x20\" ea, 1 UE support   Roller Bar massage to: quads, ITB, HS, Calf bilat         OP EDUCATION:        Goals:Access Code: [email or web address]  URL: [email or web address]  Date: 02/16/2024  Prepared by: Laura Bhardwaj    Exercises  - Supine Active Straight Leg Raise  - 1 x daily - 7 x weekly - 2 sets - 10 reps  - Seated Knee Extension with Resistance  - 1 x daily - 7 x weekly - 2 sets - 10 reps  - Seated Hamstring Curls with Resistance  - 1 x daily - 7 x weekly - 2 sets - 10 reps  - Seated Knee Lifts with Resistance  - 1 x daily - 7 x weekly - 2 sets - 10 reps  - Seated Hip Abduction with Resistance  - 1 x daily - 7 x weekly - 2 sets - 10 reps  - Seated Hip Adduction Isometrics with Ball  - 1 x daily - 7 x weekly - 2 sets - 10 reps  Active       PT Problem       PT Goal 1       Start:  02/02/24    Expected End:  05/02/24       1. Independent HEP to allow for 50% reduction in max ADL C/C sx ( 6/10) 2-3wks  2. 0/10 night time sx to allow for uninterrupted sleep x 1wk ( 1/7) 2-3wks  3. Survey score improvement from 52/80 to 60/80 (LEFS) 3-4wks  4. Strength increase to allow for improved ADL  stairs, STS (from 4/5 B hip abd to 5/5; 4+/5 B knee ext to 5/5) 3-4wks         PT Goal 2       Start:  02/02/24    Expected End:  05/02/24       1.\"I want my knees to feel better\".            "

## 2024-02-19 ENCOUNTER — TREATMENT (OUTPATIENT)
Dept: PHYSICAL THERAPY | Facility: CLINIC | Age: 64
End: 2024-02-19
Payer: MEDICAID

## 2024-02-19 DIAGNOSIS — M25.561 CHRONIC PAIN OF BOTH KNEES: ICD-10-CM

## 2024-02-19 DIAGNOSIS — M25.562 CHRONIC PAIN OF BOTH KNEES: ICD-10-CM

## 2024-02-19 DIAGNOSIS — G89.29 CHRONIC PAIN OF BOTH KNEES: ICD-10-CM

## 2024-02-19 PROCEDURE — 97110 THERAPEUTIC EXERCISES: CPT | Mod: GP

## 2024-02-19 ASSESSMENT — PAIN SCALES - GENERAL: PAINLEVEL_OUTOF10: 1

## 2024-02-19 ASSESSMENT — PAIN - FUNCTIONAL ASSESSMENT: PAIN_FUNCTIONAL_ASSESSMENT: 0-10

## 2024-02-19 NOTE — PROGRESS NOTES
"Physical Therapy Treatment    Patient Name: Jacobo Dixon  MRN: [id number]  Today's Date: 2024                                   General:  General  General Comment:     Current Problem  Problem List Items Addressed This Visit             ICD-10-CM    Chronic pain of both knees M25.561, M25.562, G89.29       Assessment:Patient identity confirmed today with name/. No additions today (time); no C/O pain with todays exercise    Plan:  Treatment/Interventions: Aquatic therapy, Cryotherapy, Education/ Instruction, Electrical stimulation, Gait training, Hot pack, Manual therapy, Self care/ home management, Therapeutic exercises (IASTM/cupping)  PT Plan: Skilled PT  PT Frequency: 2 times per week  Duration: 4wks  Onset Date: 18  Certification Period Start Date: 24  Certification Period End Date: 24  Rehab Potential: Fair (chronic syndrome)  Plan of Care Agreement: Patient  Continue with open to closed chain ROM/PRE as percy.  Subjective: I have  1/10 pain right now.       Precautions  Precautions  Precautions Comment: arthralgia multiple jts; ORIF RLE in -metal removed but recent plate placement in R ankle    Pain  Pain Assessment: 0-10  Pain Score: 1  Pain Location: Knee  Pain Orientation: Right, Left    Treatments:  Seated stepper, L1, x 5 mins    SLR with TrA 2x10  bilat 1# ea  Hook lying hip abd kayli band 2x10  P  Hook lying marches green band 10x  X  Hip add iso with play ball 10 x 3 count hold)  LAQ with blue band 2x10 bilat 2# P  Seated HS curls: blue band, 2x10 ea  (manual today)  Side steps yellow loop band 2 laps   Standing hip ext, flex and abd: 2x10 ea, B/L X (time)  Standing heel: x15 B/L  Stretch on slant board: x3' total   Fwd step-ups: x10 B   Partial squats: x10   SLS: 3x20\" ea, 1 UE support X (time)  Roller Bar massage to: quads, ITB, HS, Calf bilat X  OP EDUCATION:    Goals:Access Code: [email or web address]  URL: [email or web address]  Date: 2024  Prepared by: " "Laura Bhardwaj    Exercises  - Supine Active Straight Leg Raise  - 1 x daily - 7 x weekly - 2 sets - 10 reps  - Seated Knee Extension with Resistance  - 1 x daily - 7 x weekly - 2 sets - 10 reps  - Seated Hamstring Curls with Resistance  - 1 x daily - 7 x weekly - 2 sets - 10 reps  - Seated Knee Lifts with Resistance  - 1 x daily - 7 x weekly - 2 sets - 10 reps  - Seated Hip Abduction with Resistance  - 1 x daily - 7 x weekly - 2 sets - 10 reps  - Seated Hip Adduction Isometrics with Ball  - 1 x daily - 7 x weekly - 2 sets - 10 reps  Goals:  Active       PT Problem       PT Goal 1       Start:  02/02/24    Expected End:  05/02/24       1. Independent HEP to allow for 50% reduction in max ADL C/C sx ( 6/10) 2-3wks  2. 0/10 night time sx to allow for uninterrupted sleep x 1wk ( 1/7) 2-3wks  3. Survey score improvement from 52/80 to 60/80 (LEFS) 3-4wks  4. Strength increase to allow for improved ADL  stairs, STS (from 4/5 B hip abd to 5/5; 4+/5 B knee ext to 5/5) 3-4wks         PT Goal 2       Start:  02/02/24    Expected End:  05/02/24       1.\"I want my knees to feel better\".            "

## 2024-02-23 ENCOUNTER — TREATMENT (OUTPATIENT)
Dept: PHYSICAL THERAPY | Facility: CLINIC | Age: 64
End: 2024-02-23
Payer: MEDICAID

## 2024-02-23 DIAGNOSIS — M25.561 CHRONIC PAIN OF BOTH KNEES: ICD-10-CM

## 2024-02-23 DIAGNOSIS — M25.562 CHRONIC PAIN OF BOTH KNEES: ICD-10-CM

## 2024-02-23 DIAGNOSIS — G89.29 CHRONIC PAIN OF BOTH KNEES: ICD-10-CM

## 2024-02-23 PROCEDURE — 97110 THERAPEUTIC EXERCISES: CPT | Mod: GP,CQ

## 2024-02-23 ASSESSMENT — PAIN - FUNCTIONAL ASSESSMENT: PAIN_FUNCTIONAL_ASSESSMENT: 0-10

## 2024-02-23 ASSESSMENT — PAIN SCALES - GENERAL: PAINLEVEL_OUTOF10: 1

## 2024-02-26 ENCOUNTER — TREATMENT (OUTPATIENT)
Dept: PHYSICAL THERAPY | Facility: CLINIC | Age: 64
End: 2024-02-26
Payer: MEDICAID

## 2024-02-26 DIAGNOSIS — M25.562 CHRONIC PAIN OF BOTH KNEES: ICD-10-CM

## 2024-02-26 DIAGNOSIS — M25.561 CHRONIC PAIN OF BOTH KNEES: ICD-10-CM

## 2024-02-26 DIAGNOSIS — G89.29 CHRONIC PAIN OF BOTH KNEES: ICD-10-CM

## 2024-02-26 PROCEDURE — 97110 THERAPEUTIC EXERCISES: CPT | Mod: GP,CQ

## 2024-02-26 ASSESSMENT — PAIN SCALES - GENERAL: PAINLEVEL_OUTOF10: 1

## 2024-02-26 ASSESSMENT — PAIN - FUNCTIONAL ASSESSMENT: PAIN_FUNCTIONAL_ASSESSMENT: 0-10

## 2024-02-26 NOTE — PROGRESS NOTES
Physical Therapy Treatment    Patient Name: Jacobo Dixon  MRN: [id number]  Today's Date: 2024  Time Calculation  Start Time: 832  Stop Time: 915  Time Calculation (min): 43 min      Assessment:   Patient identified with name and .   Reviewed HEP - Patient with good understanding and voices partial compliance. Encouraged patient to become more consistent with the HEP. He denies increase in symptoms during or following session.   Patient sees supervising PT next for reassessment.     Plan:  Will continue advancing toward rehab goals and improve ADL and IADL tolerance.  OP PT Plan  Treatment/Interventions: Aquatic therapy, Cryotherapy, Education/ Instruction, Electrical stimulation, Gait training, Hot pack, Manual therapy, Self care/ home management, Therapeutic exercises (IASTM/cupping)  PT Plan: Skilled PT  PT Frequency: 2 times per week  Duration: 4wks  Onset Date: 18  Certification Period Start Date: 24  Certification Period End Date: 24  Rehab Potential: Fair (chronic syndrome)  Plan of Care Agreement: Patient    Current Problem  Problem List Items Addressed This Visit             ICD-10-CM    Chronic pain of both knees M25.561, M25.562, G89.29       Subjective   No new c/o. Stated his knees aren't as sore lately.     Precautions  Precautions  Precautions Comment: arthralgia multiple jts; ORIF RLE in -metal removed but recent plate placement in R ankle    Pain  Pain Assessment: 0-10  Pain Score: 1  Pain Location: Knee  Pain Orientation: Right, Left    Treatments:  Therapeutic Exercise: 40 minutes, 3 units    Seated stepper, L1, x 5 mins    SLR with TrA 2x10  bilat 1# ea [X - no time]  Hook lying hip abd kayli band 2x10  [X - no time]  Hook lying marches blue band 10x  [X - no time]  Hip add iso with play ball 10 x 3 count hold  LAQ with blue band 2x10 bilat X  Seated HS curls: blue band, 2x10 ea   Side steps yellow loop band 2 laps (X)  Standing hip ext, flex and abd: 2x10 ea,  "B/L  Toe taps alternating 4\" and 8\" 10x bilat (X)  Standing heel/ toe raises x15 B/L  Stretch on slant board: x3' total   Fwd and lateral step-ups: x10 B (6\" step)  Partial squats: at stairs, 2x10   SLS: 3x20\" ea, 1 UE support    OP EDUCATION:   Goals:Access Code: [email or web address]  URL: [email or web address]  Date: 02/16/2024  Prepared by: Laura Bhardwaj     Exercises  - Supine Active Straight Leg Raise  - 1 x daily - 7 x weekly - 2 sets - 10 reps  - Seated Knee Extension with Resistance  - 1 x daily - 7 x weekly - 2 sets - 10 reps  - Seated Hamstring Curls with Resistance  - 1 x daily - 7 x weekly - 2 sets - 10 reps  - Seated Knee Lifts with Resistance  - 1 x daily - 7 x weekly - 2 sets - 10 reps  - Seated Hip Abduction with Resistance  - 1 x daily - 7 x weekly - 2 sets - 10 reps  - Seated Hip Adduction Isometrics with Ball  - 1 x daily - 7 x weekly - 2 sets - 10 reps    Goals:  Active       PT Problem       PT Goal 1       Start:  02/02/24    Expected End:  05/02/24       1. Independent HEP to allow for 50% reduction in max ADL C/C sx ( 6/10) 2-3wks  2. 0/10 night time sx to allow for uninterrupted sleep x 1wk ( 1/7) 2-3wks  3. Survey score improvement from 52/80 to 60/80 (LEFS) 3-4wks  4. Strength increase to allow for improved ADL  stairs, STS (from 4/5 B hip abd to 5/5; 4+/5 B knee ext to 5/5) 3-4wks         PT Goal 2       Start:  02/02/24    Expected End:  05/02/24       1.\"I want my knees to feel better\".            "

## 2024-02-29 ENCOUNTER — TREATMENT (OUTPATIENT)
Dept: PHYSICAL THERAPY | Facility: CLINIC | Age: 64
End: 2024-02-29
Payer: MEDICAID

## 2024-02-29 DIAGNOSIS — M25.561 CHRONIC PAIN OF BOTH KNEES: ICD-10-CM

## 2024-02-29 DIAGNOSIS — M25.562 CHRONIC PAIN OF BOTH KNEES: ICD-10-CM

## 2024-02-29 DIAGNOSIS — G89.29 CHRONIC PAIN OF BOTH KNEES: ICD-10-CM

## 2024-02-29 PROCEDURE — 97110 THERAPEUTIC EXERCISES: CPT | Mod: GP

## 2024-02-29 ASSESSMENT — PAIN - FUNCTIONAL ASSESSMENT: PAIN_FUNCTIONAL_ASSESSMENT: 0-10

## 2024-02-29 ASSESSMENT — PAIN SCALES - GENERAL: PAINLEVEL_OUTOF10: 1

## 2024-02-29 NOTE — PROGRESS NOTES
"Physical Therapy Treatment    Patient Name: Jacobo Dixon  MRN: [id number]  Today's Date: 2024  Time Calculation  Start Time: 831  Stop Time: 856  Time Calculation (min): 25 min      PT Therapeutic Procedures Time Entry  Therapeutic Exercise Time Entry: 15                         General:  General  General Comment:     Current Problem  Problem List Items Addressed This Visit             ICD-10-CM    Chronic pain of both knees M25.561, M25.562, G89.29       Assessment:Patient identity confirmed today with name/. See goals; good strength and ADL percy gains; Reviewed ongoing HEP today.     Plan:  This patient agrees to discharge to ongoing HEP and home management with (good  ) progress achieved.     Subjective: I have  1 /10 pain right now.   I think it's a little better.    Precautions  Precautions  Precautions Comment: arthralgia multiple jts; ORIF RLE in -metal removed but recent plate placement in R ankle    Pain  Pain Assessment: 0-10  Pain Score: 1  Pain Location: Knee  Pain Orientation: Right, Left    Treatments:  Stdg TKE kayli x10 ea  Wall sit with marching x10 ea  Reviewed ongoing HEP today.   NOT TODAY  Seated stepper, L1, x 5 mins    SLR with TrA 2x10  bilat 1# ea [X - no time]  Hook lying hip abd kayli band 2x10  [X - no time]  Hook lying marches blue band 10x  [X - no time]  Hip add iso with play ball 10 x 3 count hold  LAQ with blue band 2x10 bilat X  Seated HS curls: blue band, 2x10 ea   Side steps yellow loop band 2 laps (X)  Standing hip ext, flex and abd: 2x10 ea, B/L  Toe taps alternating 4\" and 8\" 10x bilat (X)  Standing heel/ toe raises x15 B/L  Stretch on slant board: x3' total   Fwd and lateral step-ups: x10 B (6\" step)  Partial squats: at stairs, 2x10   SLS: 3x20\" ea, 1 UE support    OP EDUCATION:   Goals:Access Code: [email or web address]  URL: [email or web address]  Date: 2024  Prepared by: Laura Bhardwaj     Exercises  - Supine Active Straight Leg Raise  - 1 x daily - " "7 x weekly - 2 sets - 10 reps  - Seated Knee Extension with Resistance  - 1 x daily - 7 x weekly - 2 sets - 10 reps  - Seated Hamstring Curls with Resistance  - 1 x daily - 7 x weekly - 2 sets - 10 reps  - Seated Knee Lifts with Resistance  - 1 x daily - 7 x weekly - 2 sets - 10 reps  - Seated Hip Abduction with Resistance  - 1 x daily - 7 x weekly - 2 sets - 10 reps  - Seated Hip Adduction Isometrics with Ball  - 1 x daily - 7 x weekly - 2 sets - 10 reps  OP EDUCATION:  Access Code: [email or web address]  URL: [email or web address]  Date: 02/29/2024  Prepared by: Arturo Durham    Exercises  - Seated Hamstring Curl with Anchored Resistance   - Standing Terminal Knee Extension with Resistance   Access Code: [email or web address]  URL: [email or web address]  Date: 02/29/2024  Prepared by: Arturo Durham    Exercises  - Wall Sit   Goals:  Active       PT Problem       PT Goal 1       Start:  02/02/24    Expected End:  05/02/24       1. Independent HEP to allow for 50% reduction in max ADL C/C sx ( 6/10) 2-3wks 2/10 max sx within the last 5 days; 2/29/24; MET  2. 0/10 night time sx to allow for uninterrupted sleep x 1wk ( 1/7) 2-3wks still 1/7 nights; 2/29/24; NOT MET  3. Survey score improvement from 52/80 to 60/80 (LEFS) 3-4wks 59/80 as of 2/29/24;PARTIALLY MET   4. Strength increase to allow for improved ADL  stairs, STS (from 4/5 B hip abd to 5/5; 4+/5 B knee ext to 5/5) 3-4wks notes mild ADL improvements;  5/5 B knee strength; hip NT; 2/29/24; MET         PT Goal 2       Start:  02/02/24    Expected End:  05/02/24       1.\"I want my knees to feel better\". \"I think I'm a little better since starting therapy\". 2/29/24; MET            "

## 2024-04-08 ENCOUNTER — HOSPITAL ENCOUNTER (OUTPATIENT)
Dept: RADIOLOGY | Facility: CLINIC | Age: 64
Discharge: HOME | End: 2024-04-08
Payer: MEDICAID

## 2024-04-08 DIAGNOSIS — S92.311A DISPLACED FRACTURE OF FIRST METATARSAL BONE, RIGHT FOOT, INITIAL ENCOUNTER FOR CLOSED FRACTURE: ICD-10-CM

## 2024-04-08 PROCEDURE — 73630 X-RAY EXAM OF FOOT: CPT | Mod: RIGHT SIDE | Performed by: RADIOLOGY

## 2024-04-08 PROCEDURE — 73630 X-RAY EXAM OF FOOT: CPT | Mod: RT

## 2024-04-22 ENCOUNTER — LAB (OUTPATIENT)
Dept: LAB | Facility: LAB | Age: 64
End: 2024-04-22
Payer: MEDICAID

## 2024-04-22 ENCOUNTER — OFFICE VISIT (OUTPATIENT)
Dept: PRIMARY CARE | Facility: CLINIC | Age: 64
End: 2024-04-22
Payer: MEDICAID

## 2024-04-22 VITALS
WEIGHT: 277.8 LBS | SYSTOLIC BLOOD PRESSURE: 170 MMHG | OXYGEN SATURATION: 96 % | DIASTOLIC BLOOD PRESSURE: 100 MMHG | HEART RATE: 62 BPM | HEIGHT: 72 IN | BODY MASS INDEX: 37.63 KG/M2

## 2024-04-22 DIAGNOSIS — H91.90 UNILATERAL HEARING LOSS: ICD-10-CM

## 2024-04-22 DIAGNOSIS — Z12.11 ENCOUNTER FOR SCREENING FOR MALIGNANT NEOPLASM OF COLON: ICD-10-CM

## 2024-04-22 DIAGNOSIS — I10 PRIMARY HYPERTENSION: ICD-10-CM

## 2024-04-22 DIAGNOSIS — E11.69 TYPE 2 DIABETES MELLITUS WITH OTHER SPECIFIED COMPLICATION, WITHOUT LONG-TERM CURRENT USE OF INSULIN (MULTI): Primary | ICD-10-CM

## 2024-04-22 DIAGNOSIS — E11.69 TYPE 2 DIABETES MELLITUS WITH OTHER SPECIFIED COMPLICATION, WITHOUT LONG-TERM CURRENT USE OF INSULIN (MULTI): ICD-10-CM

## 2024-04-22 LAB
EST. AVERAGE GLUCOSE BLD GHB EST-MCNC: 166 MG/DL
HBA1C MFR BLD: 7.4 %

## 2024-04-22 PROCEDURE — 3008F BODY MASS INDEX DOCD: CPT | Performed by: STUDENT IN AN ORGANIZED HEALTH CARE EDUCATION/TRAINING PROGRAM

## 2024-04-22 PROCEDURE — 3077F SYST BP >= 140 MM HG: CPT | Performed by: STUDENT IN AN ORGANIZED HEALTH CARE EDUCATION/TRAINING PROGRAM

## 2024-04-22 PROCEDURE — 3051F HG A1C>EQUAL 7.0%<8.0%: CPT | Performed by: STUDENT IN AN ORGANIZED HEALTH CARE EDUCATION/TRAINING PROGRAM

## 2024-04-22 PROCEDURE — 99214 OFFICE O/P EST MOD 30 MIN: CPT | Performed by: STUDENT IN AN ORGANIZED HEALTH CARE EDUCATION/TRAINING PROGRAM

## 2024-04-22 PROCEDURE — 4010F ACE/ARB THERAPY RXD/TAKEN: CPT | Performed by: STUDENT IN AN ORGANIZED HEALTH CARE EDUCATION/TRAINING PROGRAM

## 2024-04-22 PROCEDURE — 36415 COLL VENOUS BLD VENIPUNCTURE: CPT

## 2024-04-22 PROCEDURE — 83036 HEMOGLOBIN GLYCOSYLATED A1C: CPT

## 2024-04-22 PROCEDURE — 1036F TOBACCO NON-USER: CPT | Performed by: STUDENT IN AN ORGANIZED HEALTH CARE EDUCATION/TRAINING PROGRAM

## 2024-04-22 PROCEDURE — 3080F DIAST BP >= 90 MM HG: CPT | Performed by: STUDENT IN AN ORGANIZED HEALTH CARE EDUCATION/TRAINING PROGRAM

## 2024-04-22 ASSESSMENT — PATIENT HEALTH QUESTIONNAIRE - PHQ9
2. FEELING DOWN, DEPRESSED OR HOPELESS: NOT AT ALL
1. LITTLE INTEREST OR PLEASURE IN DOING THINGS: NOT AT ALL
SUM OF ALL RESPONSES TO PHQ9 QUESTIONS 1 AND 2: 0

## 2024-04-22 NOTE — PROGRESS NOTES
Subjective   Patient ID: Jacobo Dixon is a 63 y.o. male who presents for Follow-up (Pt is here today for 3 month FUV., ).    HPI     HTN   Was on dyazide and developed ckd from dehydration   Cozaar 100mg once daily- but patient has been taking only half of this as he has had episodes of near syncope and noticed his blood pressure is on lower side. Also his blood glucose has been low intermittently.   Did not tolerate amlodipine  Tolerating current regimen at this time.   In the past, clonidine was reduced eventually, however, BP increased, due to which the clonidine was put back to his normal dose. currently taking 0.2mg. denies lightheadedness.   Increased dose of Clonidine at previous visit.    Plan: BP is high now. Recommended to take full dose of losartan. Will continue to monitor blood pressure. Sooner follow up if persistently elevated blood pressure.      Denies chest pain, SOB, headaches or blurry vision.      DM2: A1C is 7.1 with most recent blood test. down from 7.7.  Denies polyuria, polydipsia or blurry vision. recommended dietary modifications. Started metformin at previous visit. Reports that he has had hypoglycemia events. Reducing metformin dose and recheck his A1C.     Left ear ringing for 35 years. Worsening hearing in the last three weeks.   Never had ENT eval. No cerumen impaction noticed.   Referral provided.       Review of Systems  ROS negative except discussed above in HPI.    Vitals:    04/22/24 0821   BP: (!) 170/100   Pulse: 62   SpO2: 96%     Objective   Physical Exam  Constitutional:       Appearance: Normal appearance.   HENT:      Right Ear: Tympanic membrane and external ear normal. There is no impacted cerumen.      Left Ear: Tympanic membrane and external ear normal. There is no impacted cerumen.   Cardiovascular:      Rate and Rhythm: Normal rate and regular rhythm.   Pulmonary:      Effort: Pulmonary effort is normal.      Breath sounds: Normal breath sounds.   Musculoskeletal:       Cervical back: Normal range of motion and neck supple.   Neurological:      Mental Status: He is alert.           Assessment/Plan   Jacobo was seen today for follow-up.  Diagnoses and all orders for this visit:  Type 2 diabetes mellitus with other specified complication, without long-term current use of insulin (Multi) (Primary)  -     Hemoglobin A1C; Standing  Encounter for screening for malignant neoplasm of colon  -     Cologuard® colon cancer screening; Future  -     Cologuard® colon cancer screening  Unilateral hearing loss  -     Referral to ENT; Future    Due for colon cancer screening -ordered.     Follow up in 3 months. Sooner if worsening blood pressure.          Kalpesh Carmona MD MPH

## 2024-05-13 LAB — NONINV COLON CA DNA+OCC BLD SCRN STL QL: NEGATIVE

## 2024-07-16 ENCOUNTER — LAB (OUTPATIENT)
Dept: LAB | Facility: LAB | Age: 64
End: 2024-07-16
Payer: MEDICAID

## 2024-07-16 DIAGNOSIS — E11.69 TYPE 2 DIABETES MELLITUS WITH OTHER SPECIFIED COMPLICATION, WITHOUT LONG-TERM CURRENT USE OF INSULIN (MULTI): ICD-10-CM

## 2024-07-16 LAB
EST. AVERAGE GLUCOSE BLD GHB EST-MCNC: 163 MG/DL
HBA1C MFR BLD: 7.3 %

## 2024-07-16 PROCEDURE — 83036 HEMOGLOBIN GLYCOSYLATED A1C: CPT

## 2024-07-16 PROCEDURE — 36415 COLL VENOUS BLD VENIPUNCTURE: CPT

## 2024-07-23 ENCOUNTER — APPOINTMENT (OUTPATIENT)
Dept: PRIMARY CARE | Facility: CLINIC | Age: 64
End: 2024-07-23
Payer: MEDICAID

## 2024-07-23 VITALS
BODY MASS INDEX: 35.83 KG/M2 | HEIGHT: 72 IN | SYSTOLIC BLOOD PRESSURE: 130 MMHG | HEART RATE: 61 BPM | WEIGHT: 264.5 LBS | DIASTOLIC BLOOD PRESSURE: 78 MMHG | OXYGEN SATURATION: 94 %

## 2024-07-23 DIAGNOSIS — I10 PRIMARY HYPERTENSION: ICD-10-CM

## 2024-07-23 DIAGNOSIS — E11.69 TYPE 2 DIABETES MELLITUS WITH OTHER SPECIFIED COMPLICATION, WITHOUT LONG-TERM CURRENT USE OF INSULIN (MULTI): Primary | ICD-10-CM

## 2024-07-23 PROCEDURE — 3051F HG A1C>EQUAL 7.0%<8.0%: CPT | Performed by: STUDENT IN AN ORGANIZED HEALTH CARE EDUCATION/TRAINING PROGRAM

## 2024-07-23 PROCEDURE — 3078F DIAST BP <80 MM HG: CPT | Performed by: STUDENT IN AN ORGANIZED HEALTH CARE EDUCATION/TRAINING PROGRAM

## 2024-07-23 PROCEDURE — 99213 OFFICE O/P EST LOW 20 MIN: CPT | Performed by: STUDENT IN AN ORGANIZED HEALTH CARE EDUCATION/TRAINING PROGRAM

## 2024-07-23 PROCEDURE — 4010F ACE/ARB THERAPY RXD/TAKEN: CPT | Performed by: STUDENT IN AN ORGANIZED HEALTH CARE EDUCATION/TRAINING PROGRAM

## 2024-07-23 PROCEDURE — 3008F BODY MASS INDEX DOCD: CPT | Performed by: STUDENT IN AN ORGANIZED HEALTH CARE EDUCATION/TRAINING PROGRAM

## 2024-07-23 PROCEDURE — 3075F SYST BP GE 130 - 139MM HG: CPT | Performed by: STUDENT IN AN ORGANIZED HEALTH CARE EDUCATION/TRAINING PROGRAM

## 2024-07-23 PROCEDURE — 1036F TOBACCO NON-USER: CPT | Performed by: STUDENT IN AN ORGANIZED HEALTH CARE EDUCATION/TRAINING PROGRAM

## 2024-07-23 NOTE — PROGRESS NOTES
Subjective   Patient ID: Jacobo Dixon is a 64 y.o. male who presents for Follow-up (PT is here today for 3 month FUV. Lipids good 10/17/24. A1C 10/16/24. Colonoscopy good 05/06/27).    HPI    HTN   Was on dyazide and developed ckd from dehydration   Did not tolerate amlodipine  Tolerating current regimen at this time.   In the past, clonidine was reduced eventually, however, BP increased, due to which the clonidine was put back to his normal dose. currently taking 0.3mg. denies lightheadedness.   Denies chest pain, SOB, headaches or blurry vision.   Plan: BP is normal now. Will continue to monitor blood pressure. Sooner follow up if persistently elevated blood pressure.      DM2: A1C is 7.3 with most recent blood test. down from 7.4.  Denies polyuria, polydipsia or blurry vision. recommended dietary modifications. Continue metformin. Reports that he has had hypoglycemia events with higher dose. They have started working on their diet. .     Review of Systems  ROS negative except discussed above in HPI.    Vitals:    07/23/24 0819   BP: 130/78   Pulse: 61   SpO2: 94%     Objective   Physical Exam  Constitutional:       Appearance: Normal appearance.   Neurological:      Mental Status: He is alert.           Assessment/Plan   Jacobo was seen today for follow-up.  Diagnoses and all orders for this visit:  Type 2 diabetes mellitus with other specified complication, without long-term current use of insulin (Multi) (Primary)  Primary hypertension      Follow up in 3 months.     Discussed with patient that I will be leaving [facility] at the end of August. Discussed options to transfer care.      Kalpesh Carmona MD MPH

## 2024-09-30 DIAGNOSIS — E11.69 TYPE 2 DIABETES MELLITUS WITH OTHER SPECIFIED COMPLICATION, WITHOUT LONG-TERM CURRENT USE OF INSULIN: ICD-10-CM

## 2024-09-30 RX ORDER — METFORMIN HYDROCHLORIDE 500 MG/1
500 TABLET ORAL
Qty: 60 TABLET | Refills: 11 | OUTPATIENT
Start: 2024-09-30 | End: 2025-09-30

## 2024-10-01 ENCOUNTER — TELEPHONE (OUTPATIENT)
Dept: PRIMARY CARE | Facility: CLINIC | Age: 64
End: 2024-10-01
Payer: MEDICAID

## 2024-10-01 DIAGNOSIS — E11.65 TYPE 2 DIABETES MELLITUS WITH HYPERGLYCEMIA, WITHOUT LONG-TERM CURRENT USE OF INSULIN: Primary | ICD-10-CM

## 2024-10-01 DIAGNOSIS — I10 HYPERTENSION, UNSPECIFIED TYPE: ICD-10-CM

## 2024-10-01 RX ORDER — METFORMIN HYDROCHLORIDE 500 MG/1
500 TABLET ORAL
Qty: 60 TABLET | Refills: 0 | Status: SHIPPED | OUTPATIENT
Start: 2024-10-01 | End: 2024-10-31

## 2024-10-01 RX ORDER — LOSARTAN POTASSIUM 100 MG/1
100 TABLET ORAL DAILY
Qty: 30 TABLET | Refills: 0 | Status: SHIPPED | OUTPATIENT
Start: 2024-10-01 | End: 2024-10-31

## 2024-10-01 RX ORDER — CARVEDILOL 25 MG/1
25 TABLET ORAL 2 TIMES DAILY
Qty: 60 TABLET | Refills: 0 | Status: SHIPPED | OUTPATIENT
Start: 2024-10-01 | End: 2024-10-31

## 2024-10-01 RX ORDER — CLONIDINE HYDROCHLORIDE 0.3 MG/1
0.3 TABLET ORAL 3 TIMES DAILY
Qty: 90 TABLET | Refills: 0 | Status: SHIPPED | OUTPATIENT
Start: 2024-10-01 | End: 2024-10-31

## 2024-10-01 NOTE — TELEPHONE ENCOUNTER
carvedilol (Coreg) 25 mg tablet [[id number]]    Order Details  Dose: 25 mg Route: oral Frequency: 2 times daily   Dispense Quantity: 180 tablet Refills: 3          Sig: Take 1 tablet (25 mg) by mouth 2 times a day.     cloNIDine (Catapres) 0.3 mg tablet [[id number]]    Order Details  Dose: 0.3 mg Route: oral Frequency: 3 times daily   Dispense Quantity: 270 tablet Refills: 3          Sig: Take 1 tablet (0.3 mg) by mouth 3 times a day.     losartan (Cozaar) 100 mg tablet [[id number]]    Order Details  Dose: 100 mg Route: oral Frequency: Daily   Dispense Quantity: 90 tablet Refills: 3          Sig: Take 1 tablet (100 mg) by mouth once daily.   metFORMIN (Glucophage) 500 mg tablet [[id number]]    Order Details  Dose: 500 mg Route: oral Frequency: 2 times daily (morning and late afternoon)   Dispense Quantity: 60 tablet Refills: 11          Sig: Take 1 tablet (500 mg) by mouth 2 times a day with meals.   [facility] THANK YOU. WIFE WOULD LIKE A PHONE CALL WHEN THESE ARE SENT IN. [phone].

## 2024-10-25 DIAGNOSIS — I10 HYPERTENSION, UNSPECIFIED TYPE: ICD-10-CM

## 2024-10-25 RX ORDER — CARVEDILOL 25 MG/1
25 TABLET ORAL 2 TIMES DAILY
Qty: 60 TABLET | Refills: 2 | Status: SHIPPED | OUTPATIENT
Start: 2024-10-25 | End: 2025-01-23

## 2024-11-08 ENCOUNTER — TELEPHONE (OUTPATIENT)
Dept: PRIMARY CARE | Facility: CLINIC | Age: 64
End: 2024-11-08
Payer: MEDICAID

## 2024-11-08 DIAGNOSIS — E11.65 TYPE 2 DIABETES MELLITUS WITH HYPERGLYCEMIA, WITHOUT LONG-TERM CURRENT USE OF INSULIN: ICD-10-CM

## 2024-11-08 RX ORDER — METFORMIN HYDROCHLORIDE 500 MG/1
500 TABLET ORAL
Qty: 60 TABLET | Refills: 2 | Status: SHIPPED | OUTPATIENT
Start: 2024-11-08 | End: 2025-02-06

## 2024-11-08 NOTE — TELEPHONE ENCOUNTER
Jacobo's wife stopped in the office to request a new Rx for Metformin be sent to [facility]. He will need enough to last until his next appt. on Jan.23.

## 2024-11-20 ENCOUNTER — TELEPHONE (OUTPATIENT)
Dept: PRIMARY CARE | Facility: CLINIC | Age: 64
End: 2024-11-20
Payer: MEDICAID

## 2024-11-20 DIAGNOSIS — I10 HYPERTENSION, UNSPECIFIED TYPE: ICD-10-CM

## 2024-11-20 RX ORDER — CLONIDINE HYDROCHLORIDE 0.3 MG/1
0.3 TABLET ORAL 3 TIMES DAILY
Qty: 90 TABLET | Refills: 0 | Status: SHIPPED | OUTPATIENT
Start: 2024-11-20 | End: 2024-12-20

## 2024-11-20 NOTE — TELEPHONE ENCOUNTER
You don't need to call him I called him and talked him personally.  He is heading over to pick it up.  I will CC Alexander on this message so he knows.

## 2024-11-20 NOTE — TELEPHONE ENCOUNTER
I just refilled it, I'll tell Alexander, please call the patient to go pick it up. [facility] is open till 10pm. We don't need to be messing around with  meds and unnecessary ER visits.

## 2024-11-20 NOTE — TELEPHONE ENCOUNTER
It depends on how old the prescription is. It is not recommended to take a prescription past its expiration date.

## 2024-11-20 NOTE — TELEPHONE ENCOUNTER
"Patients wife stopped in to request refill of \"cloNIDine 0.3 tablet\".  Patient took his last one this afternoon (11/20/24).  Patient has an older prescription that is Clonidine 0.2mg. She wants to know if they can use that and split them in half until this refill is available.     Please send to [facility].       "

## 2024-11-21 RX ORDER — CLONIDINE HYDROCHLORIDE 0.3 MG/1
0.3 TABLET ORAL 3 TIMES DAILY
Qty: 90 TABLET | Refills: 2 | OUTPATIENT
Start: 2024-11-21 | End: 2025-02-19

## 2024-12-13 DIAGNOSIS — I10 HYPERTENSION, UNSPECIFIED TYPE: ICD-10-CM

## 2024-12-13 RX ORDER — CLONIDINE HYDROCHLORIDE 0.3 MG/1
0.3 TABLET ORAL 3 TIMES DAILY
Qty: 90 TABLET | Refills: 0 | Status: SHIPPED | OUTPATIENT
Start: 2024-12-13 | End: 2025-01-12

## 2024-12-13 RX ORDER — LOSARTAN POTASSIUM 100 MG/1
100 TABLET ORAL DAILY
Qty: 30 TABLET | Refills: 1 | Status: SHIPPED | OUTPATIENT
Start: 2024-12-13 | End: 2025-02-11

## 2024-12-13 NOTE — TELEPHONE ENCOUNTER
12/13/24  Patient is requesting refills for   Clonidine 0.3 mg tablet  Losartan 100 mg tablet    [facility] [place]    Pt has apt 1-23-25  refills proposed

## 2025-01-16 ENCOUNTER — LAB (OUTPATIENT)
Dept: LAB | Facility: LAB | Age: 65
End: 2025-01-16
Payer: MEDICAID

## 2025-01-16 DIAGNOSIS — E11.69 TYPE 2 DIABETES MELLITUS WITH OTHER SPECIFIED COMPLICATION, WITHOUT LONG-TERM CURRENT USE OF INSULIN: ICD-10-CM

## 2025-01-16 LAB
EST. AVERAGE GLUCOSE BLD GHB EST-MCNC: 157 MG/DL
HBA1C MFR BLD: 7.1 %

## 2025-01-16 PROCEDURE — 83036 HEMOGLOBIN GLYCOSYLATED A1C: CPT

## 2025-01-23 ENCOUNTER — APPOINTMENT (OUTPATIENT)
Dept: PRIMARY CARE | Facility: CLINIC | Age: 65
End: 2025-01-23
Payer: MEDICAID

## 2025-01-23 VITALS
HEIGHT: 71 IN | DIASTOLIC BLOOD PRESSURE: 82 MMHG | WEIGHT: 261 LBS | HEART RATE: 64 BPM | SYSTOLIC BLOOD PRESSURE: 142 MMHG | OXYGEN SATURATION: 99 % | BODY MASS INDEX: 36.54 KG/M2

## 2025-01-23 DIAGNOSIS — E11.65 TYPE 2 DIABETES MELLITUS WITH HYPERGLYCEMIA, WITHOUT LONG-TERM CURRENT USE OF INSULIN: ICD-10-CM

## 2025-01-23 DIAGNOSIS — Z12.5 PROSTATE CANCER SCREENING: Primary | ICD-10-CM

## 2025-01-23 DIAGNOSIS — E11.69 TYPE 2 DIABETES MELLITUS WITH OTHER SPECIFIED COMPLICATION, WITHOUT LONG-TERM CURRENT USE OF INSULIN: ICD-10-CM

## 2025-01-23 DIAGNOSIS — I10 HYPERTENSION, UNSPECIFIED TYPE: ICD-10-CM

## 2025-01-23 PROCEDURE — 3077F SYST BP >= 140 MM HG: CPT

## 2025-01-23 PROCEDURE — 3008F BODY MASS INDEX DOCD: CPT

## 2025-01-23 PROCEDURE — 4010F ACE/ARB THERAPY RXD/TAKEN: CPT

## 2025-01-23 PROCEDURE — 3051F HG A1C>EQUAL 7.0%<8.0%: CPT

## 2025-01-23 PROCEDURE — 99214 OFFICE O/P EST MOD 30 MIN: CPT

## 2025-01-23 PROCEDURE — 3079F DIAST BP 80-89 MM HG: CPT

## 2025-01-23 PROCEDURE — 1036F TOBACCO NON-USER: CPT

## 2025-01-23 RX ORDER — LOSARTAN POTASSIUM 100 MG/1
100 TABLET ORAL DAILY
Qty: 90 TABLET | Refills: 3 | Status: SHIPPED | OUTPATIENT
Start: 2025-01-23 | End: 2026-01-23

## 2025-01-23 RX ORDER — MULTIVITAMIN/IRON/FOLIC ACID 18MG-0.4MG
1 TABLET ORAL DAILY
COMMUNITY

## 2025-01-23 RX ORDER — METFORMIN HYDROCHLORIDE 500 MG/1
500 TABLET ORAL
Qty: 180 TABLET | Refills: 3 | Status: SHIPPED | OUTPATIENT
Start: 2025-01-23 | End: 2026-01-23

## 2025-01-23 RX ORDER — CARVEDILOL 25 MG/1
25 TABLET ORAL 2 TIMES DAILY
Qty: 180 TABLET | Refills: 3 | Status: SHIPPED | OUTPATIENT
Start: 2025-01-23 | End: 2026-01-23

## 2025-01-23 RX ORDER — CLONIDINE HYDROCHLORIDE 0.3 MG/1
0.3 TABLET ORAL 3 TIMES DAILY
Qty: 90 TABLET | Refills: 3 | Status: SHIPPED | OUTPATIENT
Start: 2025-01-23 | End: 2026-01-23

## 2025-01-23 ASSESSMENT — PATIENT HEALTH QUESTIONNAIRE - PHQ9
SUM OF ALL RESPONSES TO PHQ9 QUESTIONS 1 AND 2: 0
2. FEELING DOWN, DEPRESSED OR HOPELESS: NOT AT ALL
1. LITTLE INTEREST OR PLEASURE IN DOING THINGS: NOT AT ALL

## 2025-01-23 ASSESSMENT — ENCOUNTER SYMPTOMS
CONSTITUTIONAL NEGATIVE: 1
GASTROINTESTINAL NEGATIVE: 1
CARDIOVASCULAR NEGATIVE: 1
RESPIRATORY NEGATIVE: 1

## 2025-01-23 NOTE — PROGRESS NOTES
"Subjective   Patient ID: Jacobo Dixon is a 64 y.o. male who presents for pt here for med check .    HPI   Goes by \"Ezra\"  HTN: /82 in office today. BP at home at goal mostly. Denies symptoms currently. Compliant with current regimen, no SE.  DM2: A1C 7.1 last check. Compliant with metformin 1000mg isidra, no SE.     Cologuard negative 2024, due 2027.  Last PSA WNL.     Review of Systems   Constitutional: Negative.    Respiratory: Negative.     Cardiovascular: Negative.    Gastrointestinal: Negative.        Objective   /82   Pulse 64   Ht 1.803 m (5' 11\")   Wt 118 kg (261 lb)   SpO2 99%   BMI 36.40 kg/m²     Physical Exam  Constitutional:       General: He is not in acute distress.     Appearance: Normal appearance. He is not ill-appearing.   HENT:      Head: Normocephalic and atraumatic.   Eyes:      Extraocular Movements: Extraocular movements intact.      Conjunctiva/sclera: Conjunctivae normal.   Cardiovascular:      Rate and Rhythm: Normal rate and regular rhythm.      Heart sounds: Normal heart sounds. No murmur heard.  Pulmonary:      Effort: Pulmonary effort is normal.      Breath sounds: Normal breath sounds. No wheezing.   Abdominal:      General: There is no distension.   Musculoskeletal:         General: Normal range of motion.      Cervical back: Normal range of motion.   Skin:     General: Skin is warm and dry.   Neurological:      General: No focal deficit present.      Mental Status: He is alert and oriented to person, place, and time.   Psychiatric:         Mood and Affect: Mood normal.         Behavior: Behavior normal.         Thought Content: Thought content normal.         Judgment: Judgment normal.         Assessment/Plan        Chronic conditions stable.  No medication changes today.  Follow up 6 months with fasting labs prior.  "

## 2025-01-31 ENCOUNTER — HOSPITAL ENCOUNTER (EMERGENCY)
Facility: HOSPITAL | Age: 65
Discharge: HOME | End: 2025-01-31
Attending: EMERGENCY MEDICINE
Payer: MEDICAID

## 2025-01-31 ENCOUNTER — APPOINTMENT (OUTPATIENT)
Dept: RADIOLOGY | Facility: HOSPITAL | Age: 65
End: 2025-01-31
Payer: MEDICAID

## 2025-01-31 VITALS
OXYGEN SATURATION: 93 % | SYSTOLIC BLOOD PRESSURE: 157 MMHG | TEMPERATURE: 95.6 F | HEIGHT: 72 IN | HEART RATE: 63 BPM | RESPIRATION RATE: 17 BRPM | WEIGHT: 249 LBS | DIASTOLIC BLOOD PRESSURE: 92 MMHG | BODY MASS INDEX: 33.72 KG/M2

## 2025-01-31 DIAGNOSIS — I10 PRIMARY HYPERTENSION: Primary | ICD-10-CM

## 2025-01-31 LAB
ALBUMIN SERPL BCP-MCNC: 4.3 G/DL (ref 3.4–5)
ALP SERPL-CCNC: 69 U/L (ref 33–136)
ALT SERPL W P-5'-P-CCNC: 35 U/L (ref 10–52)
ANION GAP SERPL CALC-SCNC: 12 MMOL/L (ref 10–20)
AST SERPL W P-5'-P-CCNC: 35 U/L (ref 9–39)
BASOPHILS # BLD AUTO: 0.06 X10*3/UL (ref 0–0.1)
BASOPHILS NFR BLD AUTO: 0.8 %
BILIRUB SERPL-MCNC: 0.4 MG/DL (ref 0–1.2)
BUN SERPL-MCNC: 26 MG/DL (ref 6–23)
CALCIUM SERPL-MCNC: 9.6 MG/DL (ref 8.6–10.3)
CARDIAC TROPONIN I PNL SERPL HS: 27 NG/L (ref 0–20)
CARDIAC TROPONIN I PNL SERPL HS: 27 NG/L (ref 0–20)
CHLORIDE SERPL-SCNC: 105 MMOL/L (ref 98–107)
CO2 SERPL-SCNC: 26 MMOL/L (ref 21–32)
CREAT SERPL-MCNC: 1.5 MG/DL (ref 0.5–1.3)
EGFRCR SERPLBLD CKD-EPI 2021: 52 ML/MIN/1.73M*2
EOSINOPHIL # BLD AUTO: 0.16 X10*3/UL (ref 0–0.7)
EOSINOPHIL NFR BLD AUTO: 2 %
ERYTHROCYTE [DISTWIDTH] IN BLOOD BY AUTOMATED COUNT: 13.8 % (ref 11.5–14.5)
GLUCOSE SERPL-MCNC: 181 MG/DL (ref 74–99)
HCT VFR BLD AUTO: 38.7 % (ref 41–52)
HGB BLD-MCNC: 12.4 G/DL (ref 13.5–17.5)
HOLD SPECIMEN: NORMAL
IMM GRANULOCYTES # BLD AUTO: 0.05 X10*3/UL (ref 0–0.7)
IMM GRANULOCYTES NFR BLD AUTO: 0.6 % (ref 0–0.9)
LYMPHOCYTES # BLD AUTO: 1.11 X10*3/UL (ref 1.2–4.8)
LYMPHOCYTES NFR BLD AUTO: 14 %
MAGNESIUM SERPL-MCNC: 2.01 MG/DL (ref 1.6–2.4)
MCH RBC QN AUTO: 28.5 PG (ref 26–34)
MCHC RBC AUTO-ENTMCNC: 32 G/DL (ref 32–36)
MCV RBC AUTO: 89 FL (ref 80–100)
MONOCYTES # BLD AUTO: 0.64 X10*3/UL (ref 0.1–1)
MONOCYTES NFR BLD AUTO: 8.1 %
NEUTROPHILS # BLD AUTO: 5.92 X10*3/UL (ref 1.2–7.7)
NEUTROPHILS NFR BLD AUTO: 74.5 %
NRBC BLD-RTO: 0 /100 WBCS (ref 0–0)
PLATELET # BLD AUTO: 238 X10*3/UL (ref 150–450)
POTASSIUM SERPL-SCNC: 3.8 MMOL/L (ref 3.5–5.3)
PROT SERPL-MCNC: 7 G/DL (ref 6.4–8.2)
RBC # BLD AUTO: 4.35 X10*6/UL (ref 4.5–5.9)
SODIUM SERPL-SCNC: 139 MMOL/L (ref 136–145)
WBC # BLD AUTO: 7.9 X10*3/UL (ref 4.4–11.3)

## 2025-01-31 PROCEDURE — 85025 COMPLETE CBC W/AUTO DIFF WBC: CPT | Performed by: EMERGENCY MEDICINE

## 2025-01-31 PROCEDURE — 99285 EMERGENCY DEPT VISIT HI MDM: CPT | Mod: 25

## 2025-01-31 PROCEDURE — 36415 COLL VENOUS BLD VENIPUNCTURE: CPT | Performed by: EMERGENCY MEDICINE

## 2025-01-31 PROCEDURE — 71045 X-RAY EXAM CHEST 1 VIEW: CPT | Performed by: STUDENT IN AN ORGANIZED HEALTH CARE EDUCATION/TRAINING PROGRAM

## 2025-01-31 PROCEDURE — 71045 X-RAY EXAM CHEST 1 VIEW: CPT

## 2025-01-31 PROCEDURE — 80053 COMPREHEN METABOLIC PANEL: CPT | Performed by: EMERGENCY MEDICINE

## 2025-01-31 PROCEDURE — 83735 ASSAY OF MAGNESIUM: CPT | Performed by: EMERGENCY MEDICINE

## 2025-01-31 PROCEDURE — 99284 EMERGENCY DEPT VISIT MOD MDM: CPT | Performed by: EMERGENCY MEDICINE

## 2025-01-31 PROCEDURE — 84484 ASSAY OF TROPONIN QUANT: CPT | Performed by: EMERGENCY MEDICINE

## 2025-01-31 ASSESSMENT — COLUMBIA-SUICIDE SEVERITY RATING SCALE - C-SSRS
2. HAVE YOU ACTUALLY HAD ANY THOUGHTS OF KILLING YOURSELF?: NO
6. HAVE YOU EVER DONE ANYTHING, STARTED TO DO ANYTHING, OR PREPARED TO DO ANYTHING TO END YOUR LIFE?: NO
1. IN THE PAST MONTH, HAVE YOU WISHED YOU WERE DEAD OR WISHED YOU COULD GO TO SLEEP AND NOT WAKE UP?: NO

## 2025-01-31 ASSESSMENT — PAIN SCALES - GENERAL
PAINLEVEL_OUTOF10: 4
PAINLEVEL_OUTOF10: 4

## 2025-02-01 ENCOUNTER — HOSPITAL ENCOUNTER (OUTPATIENT)
Dept: CARDIOLOGY | Facility: HOSPITAL | Age: 65
Discharge: HOME | End: 2025-02-01
Payer: MEDICAID

## 2025-02-01 PROCEDURE — 93005 ELECTROCARDIOGRAM TRACING: CPT
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
## 2025-02-04 LAB
ATRIAL RATE: 87 BPM
P AXIS: 74 DEGREES
P OFFSET: 196 MS
P ONSET: 138 MS
PR INTERVAL: 156 MS
Q ONSET: 216 MS
QRS COUNT: 15 BEATS
QRS DURATION: 96 MS
QT INTERVAL: 384 MS
QTC CALCULATION(BAZETT): 462 MS
QTC FREDERICIA: 434 MS
R AXIS: -34 DEGREES
T AXIS: 94 DEGREES
T OFFSET: 408 MS
VENTRICULAR RATE: 87 BPM

## 2025-02-06 ENCOUNTER — TELEPHONE (OUTPATIENT)
Dept: PRIMARY CARE | Facility: CLINIC | Age: 65
End: 2025-02-06
Payer: MEDICAID

## 2025-02-06 NOTE — TELEPHONE ENCOUNTER
I spoke to pt wife, I will call [facility] and have them refill the losartan and metformin , new rx were sent in 1-23-25

## 2025-02-06 NOTE — TELEPHONE ENCOUNTER
Wife called stating that patient was to have Rx's renewed.  On 1/23/25 he got Metformin and Losartan.  She isn't sure why he only got 2 Rx's.  He is almost out of the Metformin.    [facility]

## 2025-02-11 ENCOUNTER — APPOINTMENT (OUTPATIENT)
Dept: PRIMARY CARE | Facility: CLINIC | Age: 65
End: 2025-02-11
Payer: MEDICAID

## 2025-02-11 VITALS
BODY MASS INDEX: 36.3 KG/M2 | OXYGEN SATURATION: 96 % | WEIGHT: 268 LBS | SYSTOLIC BLOOD PRESSURE: 146 MMHG | HEIGHT: 72 IN | DIASTOLIC BLOOD PRESSURE: 80 MMHG | HEART RATE: 56 BPM

## 2025-02-11 DIAGNOSIS — I10 PRIMARY HYPERTENSION: Primary | ICD-10-CM

## 2025-02-11 PROCEDURE — 3077F SYST BP >= 140 MM HG: CPT | Performed by: NURSE PRACTITIONER

## 2025-02-11 PROCEDURE — 3079F DIAST BP 80-89 MM HG: CPT | Performed by: NURSE PRACTITIONER

## 2025-02-11 PROCEDURE — 99213 OFFICE O/P EST LOW 20 MIN: CPT | Performed by: NURSE PRACTITIONER

## 2025-02-11 PROCEDURE — 1036F TOBACCO NON-USER: CPT | Performed by: NURSE PRACTITIONER

## 2025-02-11 PROCEDURE — 4010F ACE/ARB THERAPY RXD/TAKEN: CPT | Performed by: NURSE PRACTITIONER

## 2025-02-11 PROCEDURE — 3051F HG A1C>EQUAL 7.0%<8.0%: CPT | Performed by: NURSE PRACTITIONER

## 2025-02-11 PROCEDURE — 3008F BODY MASS INDEX DOCD: CPT | Performed by: NURSE PRACTITIONER

## 2025-02-11 ASSESSMENT — ENCOUNTER SYMPTOMS
COUGH: 0
CONSTIPATION: 0
FEVER: 0
DIARRHEA: 0
ABDOMINAL PAIN: 0
VOMITING: 0
NAUSEA: 0
DIZZINESS: 0
WHEEZING: 0
ABDOMINAL DISTENTION: 0
NUMBNESS: 0
HEADACHES: 0
WEAKNESS: 0
PALPITATIONS: 0
LIGHT-HEADEDNESS: 0
SHORTNESS OF BREATH: 0
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
## 2025-02-24 ENCOUNTER — TELEPHONE (OUTPATIENT)
Dept: PRIMARY CARE | Facility: CLINIC | Age: 65
End: 2025-02-24
Payer: MEDICAID

## 2025-02-24 DIAGNOSIS — I10 HYPERTENSION, UNSPECIFIED TYPE: ICD-10-CM

## 2025-02-24 RX ORDER — CLONIDINE HYDROCHLORIDE 0.3 MG/1
0.3 TABLET ORAL 3 TIMES DAILY
Qty: 90 TABLET | Refills: 8 | Status: SHIPPED | OUTPATIENT
Start: 2025-02-24 | End: 2026-02-24

## 2025-02-24 NOTE — TELEPHONE ENCOUNTER
Wife stating the pharmacy called about the clonidine order was not sent over right to the pharmacy. Asking if you can figure out what is going on.

## 2025-05-07 ENCOUNTER — TELEPHONE (OUTPATIENT)
Dept: PRIMARY CARE | Facility: CLINIC | Age: 65
End: 2025-05-07
Payer: MEDICAID

## 2025-05-07 DIAGNOSIS — I10 HYPERTENSION, UNSPECIFIED TYPE: ICD-10-CM

## 2025-05-07 NOTE — TELEPHONE ENCOUNTER
PT wife calling in regards to pt Clonidine reports there is no refills on this and would like a 3 month supply. [facility]

## 2025-05-07 NOTE — TELEPHONE ENCOUNTER
Spoke to pt wife and let her know PCP was out of the office today but RX was proposed and would be signed when PCP returned.,

## 2025-05-08 RX ORDER — CLONIDINE HYDROCHLORIDE 0.3 MG/1
0.3 TABLET ORAL 3 TIMES DAILY
Qty: 270 TABLET | Refills: 3 | Status: SHIPPED | OUTPATIENT
Start: 2025-05-08 | End: 2026-05-08

## 2025-07-18 LAB
ALBUMIN SERPL-MCNC: 4.3 G/DL (ref 3.6–5.1)
ALBUMIN/CREAT UR: 193 MG/G CREAT
ALP SERPL-CCNC: 65 U/L (ref 35–144)
ALT SERPL-CCNC: 18 U/L (ref 9–46)
ANION GAP SERPL CALCULATED.4IONS-SCNC: 6 MMOL/L (CALC) (ref 7–17)
AST SERPL-CCNC: 16 U/L (ref 10–35)
BILIRUB SERPL-MCNC: 0.5 MG/DL (ref 0.2–1.2)
BUN SERPL-MCNC: 29 MG/DL (ref 7–25)
CALCIUM SERPL-MCNC: 9.6 MG/DL (ref 8.6–10.3)
CHLORIDE SERPL-SCNC: 104 MMOL/L (ref 98–110)
CHOLEST SERPL-MCNC: 188 MG/DL
CHOLEST/HDLC SERPL: 4.8 (CALC)
CO2 SERPL-SCNC: 32 MMOL/L (ref 20–32)
CREAT SERPL-MCNC: 1.61 MG/DL (ref 0.7–1.35)
CREAT UR-MCNC: 95 MG/DL (ref 20–320)
EGFRCR SERPLBLD CKD-EPI 2021: 47 ML/MIN/1.73M2
ERYTHROCYTE [DISTWIDTH] IN BLOOD BY AUTOMATED COUNT: 13.6 % (ref 11–15)
EST. AVERAGE GLUCOSE BLD GHB EST-MCNC: 169 MG/DL
EST. AVERAGE GLUCOSE BLD GHB EST-SCNC: 9.3 MMOL/L
GLUCOSE SERPL-MCNC: 146 MG/DL (ref 65–99)
HBA1C MFR BLD: 7.5 %
HCT VFR BLD AUTO: 40 % (ref 38.5–50)
HDLC SERPL-MCNC: 39 MG/DL
HGB BLD-MCNC: 12.8 G/DL (ref 13.2–17.1)
LDLC SERPL CALC-MCNC: 129 MG/DL (CALC)
MCH RBC QN AUTO: 29.5 PG (ref 27–33)
MCHC RBC AUTO-ENTMCNC: 32 G/DL (ref 32–36)
MCV RBC AUTO: 92.2 FL (ref 80–100)
MICROALBUMIN UR-MCNC: 18.3 MG/DL
NONHDLC SERPL-MCNC: 149 MG/DL (CALC)
PLATELET # BLD AUTO: 241 THOUSAND/UL (ref 140–400)
PMV BLD REES-ECKER: 9.6 FL (ref 7.5–12.5)
POTASSIUM SERPL-SCNC: 4.1 MMOL/L (ref 3.5–5.3)
PROT SERPL-MCNC: 6.5 G/DL (ref 6.1–8.1)
PSA SERPL-MCNC: 1.03 NG/ML
RBC # BLD AUTO: 4.34 MILLION/UL (ref 4.2–5.8)
SODIUM SERPL-SCNC: 142 MMOL/L (ref 135–146)
TRIGL SERPL-MCNC: 92 MG/DL
WBC # BLD AUTO: 5.8 THOUSAND/UL (ref 3.8–10.8)

## 2025-07-24 ENCOUNTER — APPOINTMENT (OUTPATIENT)
Dept: PRIMARY CARE | Facility: CLINIC | Age: 65
End: 2025-07-24
Payer: MEDICAID

## 2025-07-24 VITALS
SYSTOLIC BLOOD PRESSURE: 192 MMHG | WEIGHT: 264.4 LBS | DIASTOLIC BLOOD PRESSURE: 102 MMHG | HEART RATE: 51 BPM | HEIGHT: 72 IN | BODY MASS INDEX: 35.81 KG/M2

## 2025-07-24 DIAGNOSIS — I10 PRIMARY HYPERTENSION: Primary | ICD-10-CM

## 2025-07-24 DIAGNOSIS — E78.2 MIXED HYPERLIPIDEMIA: ICD-10-CM

## 2025-07-24 DIAGNOSIS — E11.69 TYPE 2 DIABETES MELLITUS WITH OTHER SPECIFIED COMPLICATION, WITHOUT LONG-TERM CURRENT USE OF INSULIN: ICD-10-CM

## 2025-07-24 PROCEDURE — 3008F BODY MASS INDEX DOCD: CPT

## 2025-07-24 PROCEDURE — 3077F SYST BP >= 140 MM HG: CPT

## 2025-07-24 PROCEDURE — 4010F ACE/ARB THERAPY RXD/TAKEN: CPT

## 2025-07-24 PROCEDURE — 3080F DIAST BP >= 90 MM HG: CPT

## 2025-07-24 PROCEDURE — 99214 OFFICE O/P EST MOD 30 MIN: CPT

## 2025-07-24 RX ORDER — LOSARTAN POTASSIUM AND HYDROCHLOROTHIAZIDE 12.5; 1 MG/1; MG/1
1 TABLET ORAL DAILY
Qty: 30 TABLET | Refills: 2 | Status: SHIPPED | OUTPATIENT
Start: 2025-07-24 | End: 2025-10-22

## 2025-07-24 ASSESSMENT — ENCOUNTER SYMPTOMS
CONSTITUTIONAL NEGATIVE: 1
CARDIOVASCULAR NEGATIVE: 1
RESPIRATORY NEGATIVE: 1
GASTROINTESTINAL NEGATIVE: 1

## 2025-07-24 NOTE — PROGRESS NOTES
"Subjective   Patient ID: Jacobo Dixon is a 65 y.o. male who presents for Follow-up (6 month) and Diabetes (Stopped taking his metformin - stated it was making him passout).    Diabetes       Goes by \"Ezra\"  HTN: BP high in office today. Has not been checking at home. Denies symptoms currently. Compliant with current regimen, no SE.  DM2: A1C 7.5 last check. Stopped taking metformin, was \"passing out\".  MIXED HYPERLIPIDEMIA: Cannot tolerate statin, last lipids stable. Would like to hold on cardiology referral to this time.     Cologuard negative 2024, due 2027.  Last PSA WNL.     Review of Systems   Constitutional: Negative.    Respiratory: Negative.     Cardiovascular: Negative.    Gastrointestinal: Negative.        Objective   BP (!) 192/102 (BP Location: Right arm, Patient Position: Sitting)   Pulse 51   Ht 1.829 m (6')   Wt 120 kg (264 lb 6.4 oz)   BMI 35.86 kg/m²     Physical Exam  Constitutional:       General: He is not in acute distress.     Appearance: Normal appearance. He is not ill-appearing.   HENT:      Head: Normocephalic and atraumatic.     Eyes:      Extraocular Movements: Extraocular movements intact.      Conjunctiva/sclera: Conjunctivae normal.       Cardiovascular:      Rate and Rhythm: Normal rate.   Pulmonary:      Effort: Pulmonary effort is normal.   Abdominal:      General: There is no distension.     Musculoskeletal:         General: Normal range of motion.      Cervical back: Normal range of motion.     Skin:     General: Skin is warm and dry.     Neurological:      General: No focal deficit present.      Mental Status: He is alert and oriented to person, place, and time.     Psychiatric:         Mood and Affect: Mood normal.         Behavior: Behavior normal.         Thought Content: Thought content normal.         Judgment: Judgment normal.         Assessment/Plan        Add hydrochlorothiazide to regimen. Call if any SE.  Recheck BP at follow up in 3 months, check BP " daily.  Discussed ways to keep sugar controlled with lifestyle changes.  Follow up 3 months or sooner prn.

## 2025-10-24 ENCOUNTER — APPOINTMENT (OUTPATIENT)
Dept: PRIMARY CARE | Facility: CLINIC | Age: 65
End: 2025-10-24
Payer: MEDICAID